# Patient Record
Sex: FEMALE | Race: WHITE | Employment: UNEMPLOYED | URBAN - METROPOLITAN AREA
[De-identification: names, ages, dates, MRNs, and addresses within clinical notes are randomized per-mention and may not be internally consistent; named-entity substitution may affect disease eponyms.]

---

## 2017-03-07 ENCOUNTER — OFFICE VISIT (OUTPATIENT)
Dept: FAMILY MEDICINE CLINIC | Facility: CLINIC | Age: 32
End: 2017-03-07

## 2017-03-07 VITALS
DIASTOLIC BLOOD PRESSURE: 68 MMHG | WEIGHT: 164 LBS | HEART RATE: 86 BPM | SYSTOLIC BLOOD PRESSURE: 112 MMHG | OXYGEN SATURATION: 99 % | RESPIRATION RATE: 14 BRPM | TEMPERATURE: 98 F | BODY MASS INDEX: 26.05 KG/M2 | HEIGHT: 66.5 IN

## 2017-03-07 DIAGNOSIS — Z20.1 EXPOSURE TO TB: Primary | ICD-10-CM

## 2017-03-07 DIAGNOSIS — Z13.228 SCREENING FOR ENDOCRINE, NUTRITIONAL, METABOLIC AND IMMUNITY DISORDER: ICD-10-CM

## 2017-03-07 DIAGNOSIS — Z13.21 SCREENING FOR ENDOCRINE, NUTRITIONAL, METABOLIC AND IMMUNITY DISORDER: ICD-10-CM

## 2017-03-07 DIAGNOSIS — Z13.29 SCREENING FOR ENDOCRINE, NUTRITIONAL, METABOLIC AND IMMUNITY DISORDER: ICD-10-CM

## 2017-03-07 DIAGNOSIS — R93.89 ABNORMAL CT SCAN: ICD-10-CM

## 2017-03-07 DIAGNOSIS — Z13.0 SCREENING FOR ENDOCRINE, NUTRITIONAL, METABOLIC AND IMMUNITY DISORDER: ICD-10-CM

## 2017-03-07 PROCEDURE — 99203 OFFICE O/P NEW LOW 30 MIN: CPT | Performed by: EMERGENCY MEDICINE

## 2017-03-07 NOTE — PATIENT INSTRUCTIONS
Follow up in 1 month for annual physical  Have blood tests done after fasting          TB Screening (Skin)   Does this test have other names?   Tuberculin test, TST, Mantoux, PPD (purified protein derivative)  What is this test?  This test finds out if you be recommended by your healthcare provider, based on your case. What do my test results mean? Many things may affect your lab test results.  These include the method each lab uses to do the test. Even if your test results are different from the normal paco such as medicine for AIDS or cancer  How do I get ready for this test?  You don't need to prepare for this test.  Be sure your healthcare provider knows about all medicines, herbs, vitamins, and supplements you are taking.  This includes medicines that don'

## 2017-03-07 NOTE — PROGRESS NOTES
Chief Complaint:   Patient presents with:  Establish Care: Pt here to establish care. Pt c/o productive cough, started 1 week ago. Pt would also like to discuss previous diagnosis of nodule in RT lower lobe.      HPI:   This is a 33year old female     Rajan 327 hydrated, no distress  SKIN: good skin turgor, no obvious rashes  HEENT: atraumatic, normocephalic, ears, nose and throat are clear  EYES: sclera non icteric bilateral  NECK: supple, no adenopathy, no thyromegaly  LUNGS: clear to auscultation, no RRW  CARD

## 2017-03-14 PROBLEM — R93.89 ABNORMAL CT SCAN: Status: ACTIVE | Noted: 2017-03-14

## 2017-03-14 PROBLEM — Z20.1 EXPOSURE TO TB: Status: ACTIVE | Noted: 2017-03-14

## 2017-03-20 ENCOUNTER — LAB ENCOUNTER (OUTPATIENT)
Dept: LAB | Age: 32
End: 2017-03-20
Attending: EMERGENCY MEDICINE
Payer: COMMERCIAL

## 2017-03-20 ENCOUNTER — NURSE ONLY (OUTPATIENT)
Dept: FAMILY MEDICINE CLINIC | Facility: CLINIC | Age: 32
End: 2017-03-20

## 2017-03-20 DIAGNOSIS — Z13.0 SCREENING FOR ENDOCRINE, NUTRITIONAL, METABOLIC AND IMMUNITY DISORDER: ICD-10-CM

## 2017-03-20 DIAGNOSIS — Z13.21 SCREENING FOR ENDOCRINE, NUTRITIONAL, METABOLIC AND IMMUNITY DISORDER: ICD-10-CM

## 2017-03-20 DIAGNOSIS — Z13.228 SCREENING FOR ENDOCRINE, NUTRITIONAL, METABOLIC AND IMMUNITY DISORDER: ICD-10-CM

## 2017-03-20 DIAGNOSIS — Z13.29 SCREENING FOR ENDOCRINE, NUTRITIONAL, METABOLIC AND IMMUNITY DISORDER: ICD-10-CM

## 2017-03-20 DIAGNOSIS — Z20.1 EXPOSURE TO TB: Primary | ICD-10-CM

## 2017-03-20 LAB
ALBUMIN SERPL-MCNC: 4 G/DL (ref 3.5–4.8)
ALP LIVER SERPL-CCNC: 55 U/L (ref 37–98)
ALT SERPL-CCNC: 15 U/L (ref 14–54)
AST SERPL-CCNC: 14 U/L (ref 15–41)
BASOPHILS # BLD AUTO: 0.05 X10(3) UL (ref 0–0.1)
BASOPHILS NFR BLD AUTO: 0.9 %
BILIRUB SERPL-MCNC: 0.5 MG/DL (ref 0.1–2)
BUN BLD-MCNC: 12 MG/DL (ref 8–20)
CALCIUM BLD-MCNC: 8.7 MG/DL (ref 8.3–10.3)
CHLORIDE: 107 MMOL/L (ref 101–111)
CHOLEST SMN-MCNC: 147 MG/DL (ref ?–200)
CO2: 23 MMOL/L (ref 22–32)
CREAT BLD-MCNC: 0.82 MG/DL (ref 0.55–1.02)
EOSINOPHIL # BLD AUTO: 0.13 X10(3) UL (ref 0–0.3)
EOSINOPHIL NFR BLD AUTO: 2.4 %
ERYTHROCYTE [DISTWIDTH] IN BLOOD BY AUTOMATED COUNT: 12.3 % (ref 11.5–16)
FREE T4: 1 NG/DL (ref 0.9–1.8)
GLUCOSE BLD-MCNC: 86 MG/DL (ref 70–99)
HCT VFR BLD AUTO: 39.8 % (ref 34–50)
HDLC SERPL-MCNC: 50 MG/DL (ref 45–?)
HDLC SERPL: 2.94 {RATIO} (ref ?–4.44)
HGB BLD-MCNC: 12.9 G/DL (ref 12–16)
IMMATURE GRANULOCYTE COUNT: 0.01 X10(3) UL (ref 0–1)
IMMATURE GRANULOCYTE RATIO %: 0.2 %
LDLC SERPL CALC-MCNC: 78 MG/DL (ref ?–130)
LYMPHOCYTES # BLD AUTO: 1.8 X10(3) UL (ref 0.9–4)
LYMPHOCYTES NFR BLD AUTO: 33.5 %
M PROTEIN MFR SERPL ELPH: 7 G/DL (ref 6.1–8.3)
MCH RBC QN AUTO: 30.2 PG (ref 27–33.2)
MCHC RBC AUTO-ENTMCNC: 32.4 G/DL (ref 31–37)
MCV RBC AUTO: 93.2 FL (ref 81–100)
MONOCYTES # BLD AUTO: 0.38 X10(3) UL (ref 0.1–0.6)
MONOCYTES NFR BLD AUTO: 7.1 %
NEUTROPHIL ABS PRELIM: 3 X10 (3) UL (ref 1.3–6.7)
NEUTROPHILS # BLD AUTO: 3 X10(3) UL (ref 1.3–6.7)
NEUTROPHILS NFR BLD AUTO: 55.9 %
NONHDLC SERPL-MCNC: 97 MG/DL (ref ?–130)
PLATELET # BLD AUTO: 215 10(3)UL (ref 150–450)
POTASSIUM SERPL-SCNC: 4 MMOL/L (ref 3.6–5.1)
RBC # BLD AUTO: 4.27 X10(6)UL (ref 3.8–5.1)
RED CELL DISTRIBUTION WIDTH-SD: 42.5 FL (ref 35.1–46.3)
SODIUM SERPL-SCNC: 140 MMOL/L (ref 136–144)
TRIGLYCERIDES: 94 MG/DL (ref ?–150)
TSI SER-ACNC: 5.51 MIU/ML (ref 0.35–5.5)
VLDL: 19 MG/DL (ref 5–40)
WBC # BLD AUTO: 5.4 X10(3) UL (ref 4–13)

## 2017-03-20 PROCEDURE — 80053 COMPREHEN METABOLIC PANEL: CPT

## 2017-03-20 PROCEDURE — 84443 ASSAY THYROID STIM HORMONE: CPT

## 2017-03-20 PROCEDURE — 84439 ASSAY OF FREE THYROXINE: CPT

## 2017-03-20 PROCEDURE — 80061 LIPID PANEL: CPT

## 2017-03-20 PROCEDURE — 86580 TB INTRADERMAL TEST: CPT | Performed by: FAMILY MEDICINE

## 2017-03-20 PROCEDURE — 85025 COMPLETE CBC W/AUTO DIFF WBC: CPT

## 2017-03-20 PROCEDURE — 36415 COLL VENOUS BLD VENIPUNCTURE: CPT

## 2017-03-22 ENCOUNTER — NURSE ONLY (OUTPATIENT)
Dept: FAMILY MEDICINE CLINIC | Facility: CLINIC | Age: 32
End: 2017-03-22

## 2017-03-22 LAB — INDURATION (): 0 MM (ref 0–11)

## 2017-04-04 ENCOUNTER — OFFICE VISIT (OUTPATIENT)
Dept: FAMILY MEDICINE CLINIC | Facility: CLINIC | Age: 32
End: 2017-04-04

## 2017-04-04 ENCOUNTER — HOSPITAL ENCOUNTER (OUTPATIENT)
Dept: ULTRASOUND IMAGING | Age: 32
Discharge: HOME | End: 2017-04-04
Attending: EMERGENCY MEDICINE
Payer: COMMERCIAL

## 2017-04-04 ENCOUNTER — LAB ENCOUNTER (OUTPATIENT)
Dept: LAB | Age: 32
End: 2017-04-04
Attending: EMERGENCY MEDICINE
Payer: COMMERCIAL

## 2017-04-04 VITALS
OXYGEN SATURATION: 99 % | WEIGHT: 166 LBS | HEART RATE: 70 BPM | RESPIRATION RATE: 16 BRPM | DIASTOLIC BLOOD PRESSURE: 68 MMHG | SYSTOLIC BLOOD PRESSURE: 104 MMHG | TEMPERATURE: 98 F | BODY MASS INDEX: 26 KG/M2

## 2017-04-04 DIAGNOSIS — N92.6 IRREGULAR MENSES: ICD-10-CM

## 2017-04-04 DIAGNOSIS — R10.2 PELVIC PAIN: ICD-10-CM

## 2017-04-04 DIAGNOSIS — R10.2 PELVIC PAIN: Primary | ICD-10-CM

## 2017-04-04 PROCEDURE — 36415 COLL VENOUS BLD VENIPUNCTURE: CPT

## 2017-04-04 PROCEDURE — 87591 N.GONORRHOEAE DNA AMP PROB: CPT

## 2017-04-04 PROCEDURE — 99214 OFFICE O/P EST MOD 30 MIN: CPT | Performed by: EMERGENCY MEDICINE

## 2017-04-04 PROCEDURE — 87491 CHLMYD TRACH DNA AMP PROBE: CPT

## 2017-04-04 PROCEDURE — 84703 CHORIONIC GONADOTROPIN ASSAY: CPT

## 2017-04-04 PROCEDURE — 76830 TRANSVAGINAL US NON-OB: CPT

## 2017-04-04 PROCEDURE — 76856 US EXAM PELVIC COMPLETE: CPT

## 2017-04-04 PROCEDURE — 85025 COMPLETE CBC W/AUTO DIFF WBC: CPT

## 2017-04-04 NOTE — PATIENT INSTRUCTIONS
Thank you for choosing [facility]  To Do:  FOR MARTHA PEARSON  1. Arrange for ultrasound  2. Have blood tests done today  3. Follow up for annual exam  4. May try OTC Tylenol for pain and cramping  5.  TO Er if with profuse uncontrolled bleedin provider  · Pain worsens or you have sudden, severe pain or new pain  · Nausea, vomiting, sweating, or restlessness  · Dizziness or fainting  · Unusual vaginal discharge  · Abnormal vaginal bleeding (especially bleeding after menopause)  Date Last Reviewed

## 2017-04-04 NOTE — PROGRESS NOTES
Chief Complaint:   Patient presents with:  Irregular Periods    HPI:   This is a 33year old female C/O abdominal cramping and irregular vaginal bleeding. Yesterday passed an odd looking clot. Did home pregnancy test a few days ago which was neg.  No missed age, well groomed.   GENERAL: well developed, well nourished, well hydrated, no distress  SKIN: good skin turgor, no obvious rashes  HEENT: atraumatic, normocephalic, ears, nose and throat are clear  EYES: sclera non icteric bilateral  NECK: supple, no ivan

## 2017-04-05 ENCOUNTER — TELEPHONE (OUTPATIENT)
Dept: FAMILY MEDICINE CLINIC | Facility: CLINIC | Age: 32
End: 2017-04-05

## 2017-04-05 DIAGNOSIS — R79.89 ELEVATED TSH: Primary | ICD-10-CM

## 2017-04-05 NOTE — TELEPHONE ENCOUNTER
Pt notified on negative HCG/CBC. ralph Pedraza still in process. She states understanding. Pt seen in office on 4/4, TSH was elevated per 3/21 lab results & pt was to f/u in OV, did you talk to pt about elevated TSH?   If not, any orders for pt?

## 2017-04-05 NOTE — TELEPHONE ENCOUNTER
----- Message from Joel Rebollar MD sent at 4/4/2017  9:08 PM CDT -----  No acute findings on US  Pls ask pt to follow up in 1 week if not better

## 2017-04-05 NOTE — TELEPHONE ENCOUNTER
----- Message from Tung Tamayo MD sent at 3/21/2017  1:28 PM CDT -----  + Elevated TSH  Pt needs follow up OV for discussion of ABN TSH

## 2017-04-06 NOTE — TELEPHONE ENCOUNTER
Called patient  Discussed elevated TSH  Pt to repeat blood tests in 2 weeks  Follow up in 2 weeks for discussion of results  Labs pended

## 2017-04-24 ENCOUNTER — APPOINTMENT (OUTPATIENT)
Dept: LAB | Age: 32
End: 2017-04-24
Attending: EMERGENCY MEDICINE
Payer: COMMERCIAL

## 2017-04-24 DIAGNOSIS — R79.89 ELEVATED TSH: ICD-10-CM

## 2017-04-24 PROCEDURE — 86800 THYROGLOBULIN ANTIBODY: CPT

## 2017-04-24 PROCEDURE — 84443 ASSAY THYROID STIM HORMONE: CPT

## 2017-04-24 PROCEDURE — 86376 MICROSOMAL ANTIBODY EACH: CPT

## 2017-04-24 PROCEDURE — 84439 ASSAY OF FREE THYROXINE: CPT

## 2017-04-24 PROCEDURE — 36415 COLL VENOUS BLD VENIPUNCTURE: CPT

## 2017-04-28 ENCOUNTER — OFFICE VISIT (OUTPATIENT)
Dept: FAMILY MEDICINE CLINIC | Facility: CLINIC | Age: 32
End: 2017-04-28

## 2017-04-28 VITALS
HEIGHT: 66.5 IN | SYSTOLIC BLOOD PRESSURE: 104 MMHG | DIASTOLIC BLOOD PRESSURE: 66 MMHG | BODY MASS INDEX: 26.84 KG/M2 | RESPIRATION RATE: 14 BRPM | WEIGHT: 169 LBS | OXYGEN SATURATION: 99 % | TEMPERATURE: 98 F | HEART RATE: 92 BPM

## 2017-04-28 DIAGNOSIS — E03.9 HYPOTHYROIDISM, UNSPECIFIED TYPE: Primary | ICD-10-CM

## 2017-04-28 PROCEDURE — 99214 OFFICE O/P EST MOD 30 MIN: CPT | Performed by: EMERGENCY MEDICINE

## 2017-04-28 RX ORDER — LEVOTHYROXINE SODIUM 0.05 MG/1
50 TABLET ORAL
Qty: 90 TABLET | Refills: 0 | Status: SHIPPED | OUTPATIENT
Start: 2017-04-28 | End: 2017-08-24

## 2017-04-28 NOTE — PATIENT INSTRUCTIONS
Thank you for choosing [facility]  To Do:  FOR MARTHA PEARSON  1. Follow up in 6-8 weeks  2. Have blood test done before office visit          Hypothyroidism       You have hypothyroidism.  This means your thyroid gland is not making enough thy hormone pills. · Don’t take other medicines with your thyroid hormone pill without checking with your provider first.  · Tell your provider if you have any side effects from your medicines that bother you.   · Never change the dosage or stop taking your th pituitary gland can lead to not enough production of thyroid hormone. Hypothyroidism will also occur if the thyroid gland is removed during surgery.   Common symptoms include:  · Low energy and tiredness  · Depression  · Feeling cold  · Muscle pain  · Slowe follow your healthcare professional's instructions. Thyroid Stimulating Hormone  Does this test have other names? TSH,  thyrotropin test  What is this test?  This is a blood test that measures your level of thyroid stimulating hormone (TSH).  Health thyroid hormones  · TSH receptor-stimulator antibodies, which is used to diagnose Graves disease  · Thyroid antiperoxidase antibodies and thyroglobulin antibodies. These are used to diagnose a condition called Hashimoto's thyroiditis.   What do my test resu Always follow your healthcare professional's instructions. Treating Thyroid Problems    Your healthcare provider has diagnosed a thyroid problem and will work with you to create a treatment plan.  Even if you don’t have symptoms, getting proper care iodine ablation. This is the most common treatment for hyperthyroidism. It’s done by taking a pill or liquid dose of radioactive iodine. This treatment destroys the thyroid cells that are making too much hormone.  You may need daily thyroid hormone pills af possible complications. You will likely receive the iodine at the hospital and go home the same day. The risk from the radiation to yourself and others is very small. However, you may need to stay away from other people for several days.  It is most importa

## 2017-04-28 NOTE — PROGRESS NOTES
Chief Complaint:   Patient presents with: Follow - Up: F/u thryoid    HPI:   This is a 33year old female     FF UP ABNORMAL LABS  Patient is a 33-year-old female presents today for follow-up regarding recent laboratories have been done.   Recent laborator (36.8 °C) (Oral)  Resp 14  Ht 66.5\"  Wt 169 lb  BMI 26.87 kg/m2  SpO2 99%  LMP 04/05/2017 (Approximate) Estimated body mass index is 26.87 kg/(m^2) as calculated from the following:    Height as of this encounter: 66.5\".     Weight as of this encounter: 1 4. 0-13.0 x10(3) uL   RBC 4.27 3.80-5.10 x10(6)uL   HGB 12.9 12.0-16.0 g/dL   HCT 39.8 34.0-50.0 %   .0 150.0-450.0 10(3)uL   MCV 93.2 81.0-100.0 fL   MCH 30.2 27.0-33.2 pg   MCHC 32.4 31.0-37.0 g/dL   RDW 12.3 11.5-16.0 %   RDW-SD 42.5 35.1-46.3 fL Absolute 0.48 0.10-0.60 x10(3) uL   Eosinophil Absolute 0.11 0.00-0.30 x10(3) uL   Basophil Absolute 0.04 0.00-0.10 x10(3) uL   Immature Granulocyte Absolute 0.01 0.00-1.00 x10(3) uL   Neutrophil % 55.8 %   Lymphocyte % 33.2 %   Monocyte % 8.2 %   Eosinoph

## 2017-06-09 ENCOUNTER — APPOINTMENT (OUTPATIENT)
Dept: LAB | Age: 32
End: 2017-06-09
Attending: EMERGENCY MEDICINE
Payer: COMMERCIAL

## 2017-06-09 DIAGNOSIS — E03.9 HYPOTHYROIDISM, UNSPECIFIED TYPE: ICD-10-CM

## 2017-06-09 PROCEDURE — 84443 ASSAY THYROID STIM HORMONE: CPT

## 2017-06-09 PROCEDURE — 36415 COLL VENOUS BLD VENIPUNCTURE: CPT

## 2017-06-09 PROCEDURE — 84439 ASSAY OF FREE THYROXINE: CPT

## 2017-06-12 ENCOUNTER — OFFICE VISIT (OUTPATIENT)
Dept: FAMILY MEDICINE CLINIC | Facility: CLINIC | Age: 32
End: 2017-06-12

## 2017-06-12 VITALS
BODY MASS INDEX: 26.2 KG/M2 | DIASTOLIC BLOOD PRESSURE: 64 MMHG | HEIGHT: 66.5 IN | SYSTOLIC BLOOD PRESSURE: 108 MMHG | TEMPERATURE: 98 F | WEIGHT: 165 LBS | HEART RATE: 62 BPM | RESPIRATION RATE: 14 BRPM | OXYGEN SATURATION: 99 %

## 2017-06-12 DIAGNOSIS — E03.9 HYPOTHYROIDISM, UNSPECIFIED TYPE: Primary | ICD-10-CM

## 2017-06-12 DIAGNOSIS — R53.83 FATIGUE, UNSPECIFIED TYPE: ICD-10-CM

## 2017-06-12 PROCEDURE — 99213 OFFICE O/P EST LOW 20 MIN: CPT | Performed by: EMERGENCY MEDICINE

## 2017-06-12 RX ORDER — DOXEPIN HYDROCHLORIDE 50 MG/1
1 CAPSULE ORAL DAILY
COMMUNITY
End: 2018-03-14 | Stop reason: ALTCHOICE

## 2017-06-12 NOTE — PROGRESS NOTES
Chief Complaint:   Patient presents with:  Lab Results: review thyroid labs    HPI:   This is a 27year old female   Here for follow up thyroid problems  Recently started on Levothyroxine  Feels about the same. No new sx  Still complains of fatigue.    No l Review of systems significant for fatigue    The rest of the ROS is negative except for those stated as above    PHYSICAL EXAM:   /64 mmHg  Pulse 62  Temp(Src) 97.9 °F (36.6 °C) (Oral)  Resp 14  Ht 66.5\"  Wt 165 lb  BMI 26.24 kg/m2  SpO2 99%  LMP very much. Review of her TSH in the past shows that she has had high TSH > 5 in the past. Today TSH is lower at 1.5. T4 remains stable. Will continue with levothyroxine replacement for now. We will continue to monitor.   Time body significant for positi

## 2017-06-12 NOTE — PATIENT INSTRUCTIONS
Thank you for choosing [facility]  To Do:  FOR MARTHA PEARSON  1. Continue with Levothyroxine  2. Follow up in 8 weeks  3. Repeat blood tests in 8 weeks              Hypothyroidism       You have hypothyroidism.  This means your thyroid gland i of taking your thyroid hormone pills. · Don’t take other medicines with your thyroid hormone pill without checking with your provider first.  · Tell your provider if you have any side effects from your medicines that bother you.   · Never change the dosage your thyroid doesn’t make. You will likely need to take a daily pill for the rest of your life. Your healthcare provider will adjust your dose to achieve the right hormone levels. Take the thyroid hormone pill on an empty stomach, without other medicines. by beta-blockers alone. Treating nodules  If you have benign nodules, you may not need treatment right away. Instead, your healthcare provider may advise regular exams and ultrasound tests to see if the nodules grow.  If treatment is needed, it may include

## 2017-07-12 NOTE — ADDENDUM NOTE
Encounter addended by: Bijan Hall, [address] on: 7/12/2017  8:54 AM<BR>    Actions taken: Letter status changed

## 2017-07-21 ENCOUNTER — APPOINTMENT (OUTPATIENT)
Dept: LAB | Age: 32
End: 2017-07-21
Attending: EMERGENCY MEDICINE
Payer: COMMERCIAL

## 2017-07-21 DIAGNOSIS — E03.9 HYPOTHYROIDISM, UNSPECIFIED TYPE: ICD-10-CM

## 2017-07-21 LAB
FREE T4: 1 NG/DL (ref 0.9–1.8)
TSI SER-ACNC: 2.82 MIU/ML (ref 0.35–5.5)

## 2017-07-21 PROCEDURE — 84439 ASSAY OF FREE THYROXINE: CPT

## 2017-07-21 PROCEDURE — 84443 ASSAY THYROID STIM HORMONE: CPT

## 2017-07-21 PROCEDURE — 36415 COLL VENOUS BLD VENIPUNCTURE: CPT

## 2017-07-25 ENCOUNTER — OFFICE VISIT (OUTPATIENT)
Dept: FAMILY MEDICINE CLINIC | Facility: CLINIC | Age: 32
End: 2017-07-25

## 2017-07-25 VITALS
RESPIRATION RATE: 14 BRPM | HEART RATE: 76 BPM | WEIGHT: 175 LBS | BODY MASS INDEX: 27.79 KG/M2 | TEMPERATURE: 98 F | OXYGEN SATURATION: 99 % | HEIGHT: 66.5 IN | DIASTOLIC BLOOD PRESSURE: 70 MMHG | SYSTOLIC BLOOD PRESSURE: 106 MMHG

## 2017-07-25 DIAGNOSIS — E06.3 HYPOTHYROIDISM, ACQUIRED, AUTOIMMUNE: Primary | ICD-10-CM

## 2017-07-25 DIAGNOSIS — E66.3 OVERWEIGHT (BMI 25.0-29.9): ICD-10-CM

## 2017-07-25 PROCEDURE — 99213 OFFICE O/P EST LOW 20 MIN: CPT | Performed by: EMERGENCY MEDICINE

## 2017-07-25 RX ORDER — LEVOTHYROXINE SODIUM 0.07 MG/1
75 TABLET ORAL
Qty: 30 TABLET | Refills: 2 | Status: SHIPPED | OUTPATIENT
Start: 2017-07-25 | End: 2017-08-24

## 2017-07-25 NOTE — PROGRESS NOTES
Chief Complaint:   Patient presents with: Follow - Up: F/U LAB RESULTS    HPI:   This is a 27year old female       FF UP HYPOTHYROIDISM  Reports increased energy. Mood is better.  Still with fatigue but with noticeable improvement  Feels difference while systems significant for weight gain    The rest of the ROS is negative except for those stated as above    PHYSICAL EXAM:   /70 (BP Location: Right arm, Patient Position: Sitting, Cuff Size: adult)   Pulse 76   Temp 97.9 °F (36.6 °C) (Oral)   Resp 14

## 2017-07-25 NOTE — PATIENT INSTRUCTIONS
Thank you for choosing [address] [place] [address] Group  To Do:  FOR MARTHA PEARSON  1. Ff up in 8 weeks  2. Blood test b efore visit        Hypothyroidism       You have hypothyroidism. This means your thyroid gland is not making enough thyroid hormone.  This hormo take other medicines with your thyroid hormone pill without checking with your provider first.  · Tell your provider if you have any side effects from your medicines that bother you.   · Never change the dosage or stop taking your thyroid pills without talk week), or a health goal (such as lowering your blood pressure). Choose a goal that is measurable and reasonable, so you know when you've reached it.  A goal of reaching a BMI of less than 25 is not always reasonable (or possible).   Make an action plan  Hab healthcare provider. Fiction: “The faster I lose weight, the better.”  Fact: Rapid weight loss is usually due to loss of water or muscle mass. What you’re trying to get rid of is extra fat. Aim to lose a 1/2 pound to 2 pounds a week.  Then you’re more li 2/4/2016  © 3068-9057 The [address] [place] [address], [address] [place] [address]. All rights reserved. This information is not intended as a substitute for professional medical care. Always follow your healthcare professional's instructions. start an exercise program. Have a  help you develop a plan that’s safe for you. Date Last Reviewed: 2/4/2016  © 1910-9679 The [address] [place] [address], [address] [place] [address]. All rights reserved.  This information is n else instead of eating. · Eat slower, shooting for 20 to 30 minutes for each meal. It takes 20 minutes for your stomach to tell your brain that it’s full.  Slow eaters tend to eat less and are still satisfied, while fast eaters may tend to be overeaters.

## 2017-08-22 ENCOUNTER — HOSPITAL ENCOUNTER (EMERGENCY)
Age: 32
Discharge: HOME | End: 2017-08-22
Attending: EMERGENCY MEDICINE
Payer: COMMERCIAL

## 2017-08-22 ENCOUNTER — APPOINTMENT (OUTPATIENT)
Dept: GENERAL RADIOLOGY | Age: 32
End: 2017-08-22
Payer: COMMERCIAL

## 2017-08-22 VITALS
DIASTOLIC BLOOD PRESSURE: 72 MMHG | HEART RATE: 78 BPM | HEIGHT: 66 IN | TEMPERATURE: 98 F | BODY MASS INDEX: 27.97 KG/M2 | SYSTOLIC BLOOD PRESSURE: 137 MMHG | OXYGEN SATURATION: 100 % | RESPIRATION RATE: 20 BRPM | WEIGHT: 174 LBS

## 2017-08-22 DIAGNOSIS — R07.9 ACUTE CHEST PAIN: Primary | ICD-10-CM

## 2017-08-22 LAB
ALBUMIN SERPL-MCNC: 3.9 G/DL (ref 3.5–4.8)
ALP LIVER SERPL-CCNC: 50 U/L (ref 37–98)
ALT SERPL-CCNC: 13 U/L (ref 14–54)
AST SERPL-CCNC: 12 U/L (ref 15–41)
BASOPHILS # BLD AUTO: 0.07 X10(3) UL (ref 0–0.1)
BASOPHILS NFR BLD AUTO: 1.1 %
BILIRUB SERPL-MCNC: 0.4 MG/DL (ref 0.1–2)
BUN BLD-MCNC: 14 MG/DL (ref 8–20)
CALCIUM BLD-MCNC: 8.2 MG/DL (ref 8.3–10.3)
CHLORIDE: 106 MMOL/L (ref 101–111)
CO2: 28 MMOL/L (ref 22–32)
CREAT BLD-MCNC: 0.8 MG/DL (ref 0.55–1.02)
D-DIMER: <0.27 UG/ML FEU (ref 0–0.49)
EOSINOPHIL # BLD AUTO: 0.23 X10(3) UL (ref 0–0.3)
EOSINOPHIL NFR BLD AUTO: 3.5 %
ERYTHROCYTE [DISTWIDTH] IN BLOOD BY AUTOMATED COUNT: 11.9 % (ref 11.5–16)
GLUCOSE BLD-MCNC: 105 MG/DL (ref 70–99)
HCT VFR BLD AUTO: 36.4 % (ref 34–50)
HGB BLD-MCNC: 11.9 G/DL (ref 12–16)
IMMATURE GRANULOCYTE COUNT: 0.01 X10(3) UL (ref 0–1)
IMMATURE GRANULOCYTE RATIO %: 0.2 %
LYMPHOCYTES # BLD AUTO: 2.64 X10(3) UL (ref 0.9–4)
LYMPHOCYTES NFR BLD AUTO: 40.3 %
M PROTEIN MFR SERPL ELPH: 7.3 G/DL (ref 6.1–8.3)
MCH RBC QN AUTO: 30.4 PG (ref 27–33.2)
MCHC RBC AUTO-ENTMCNC: 32.7 G/DL (ref 31–37)
MCV RBC AUTO: 93.1 FL (ref 81–100)
MONOCYTES # BLD AUTO: 0.46 X10(3) UL (ref 0.1–0.6)
MONOCYTES NFR BLD AUTO: 7 %
NEUTROPHIL ABS PRELIM: 3.14 X10 (3) UL (ref 1.3–6.7)
NEUTROPHILS # BLD AUTO: 3.14 X10(3) UL (ref 1.3–6.7)
NEUTROPHILS NFR BLD AUTO: 47.9 %
PLATELET # BLD AUTO: 214 10(3)UL (ref 150–450)
POCT LOT NUMBER: NORMAL
POCT URINE PREGNANCY: NEGATIVE
POTASSIUM SERPL-SCNC: 3.8 MMOL/L (ref 3.6–5.1)
RBC # BLD AUTO: 3.91 X10(6)UL (ref 3.8–5.1)
RED CELL DISTRIBUTION WIDTH-SD: 41 FL (ref 35.1–46.3)
SODIUM SERPL-SCNC: 139 MMOL/L (ref 136–144)
TROPONIN: <0.046 NG/ML (ref ?–0.05)
WBC # BLD AUTO: 6.6 X10(3) UL (ref 4–13)

## 2017-08-22 PROCEDURE — 81025 URINE PREGNANCY TEST: CPT

## 2017-08-22 PROCEDURE — 93005 ELECTROCARDIOGRAM TRACING: CPT

## 2017-08-22 PROCEDURE — 93010 ELECTROCARDIOGRAM REPORT: CPT

## 2017-08-22 PROCEDURE — 84484 ASSAY OF TROPONIN QUANT: CPT | Performed by: EMERGENCY MEDICINE

## 2017-08-22 PROCEDURE — 85025 COMPLETE CBC W/AUTO DIFF WBC: CPT | Performed by: EMERGENCY MEDICINE

## 2017-08-22 PROCEDURE — 80053 COMPREHEN METABOLIC PANEL: CPT | Performed by: EMERGENCY MEDICINE

## 2017-08-22 PROCEDURE — 71020 XR CHEST PA + LAT CHEST (CPT=71020): CPT

## 2017-08-22 PROCEDURE — 85025 COMPLETE CBC W/AUTO DIFF WBC: CPT

## 2017-08-22 PROCEDURE — 99285 EMERGENCY DEPT VISIT HI MDM: CPT

## 2017-08-22 PROCEDURE — 36415 COLL VENOUS BLD VENIPUNCTURE: CPT

## 2017-08-22 PROCEDURE — 80053 COMPREHEN METABOLIC PANEL: CPT

## 2017-08-22 PROCEDURE — 84484 ASSAY OF TROPONIN QUANT: CPT

## 2017-08-22 PROCEDURE — 71020 XR CHEST PA + LAT CHEST (CPT=71020): CPT | Performed by: EMERGENCY MEDICINE

## 2017-08-22 PROCEDURE — 85378 FIBRIN DEGRADE SEMIQUANT: CPT | Performed by: EMERGENCY MEDICINE

## 2017-08-22 RX ORDER — ACETAMINOPHEN 500 MG
1000 TABLET ORAL ONCE
Status: COMPLETED | OUTPATIENT
Start: 2017-08-22 | End: 2017-08-22

## 2017-08-23 LAB
ATRIAL RATE: 63 BPM
P AXIS: 69 DEGREES
P-R INTERVAL: 140 MS
Q-T INTERVAL: 428 MS
QRS DURATION: 82 MS
QTC CALCULATION (BEZET): 437 MS
R AXIS: 80 DEGREES
T AXIS: 67 DEGREES
VENTRICULAR RATE: 63 BPM

## 2017-08-23 NOTE — ED INITIAL ASSESSMENT (HPI)
STS LEFT CHEST AND LEFT AXILLA PAIN SINCE 15 MIN PTA. DENIES SOB WITH ONSET. DENIES N/V. STS HISTORY OF HAVING TO WEAR HOLTER MONITORS IN THE PAST.

## 2017-08-23 NOTE — ED PROVIDER NOTES
Patient Seen in: [facility] [place]    History   Patient presents with:  Chest Pain Angina (cardiovascular)  Dizziness (neurologic)  Numbness Weakness (neurologic)    Stated Complaint: Tightness in chest, dizziness, numbness to lef Family History   Problem Relation Age of Onset   • Other [OTHER] Other      osteoporosis mat side   • Other [OTHER] Father      Diverticulosis   • Cancer Mother      blastoma   • Osteoporosis [OTHER] Maternal Aunt    • Osteoporosis [OTHER] Maternal [place] AST 12 (*)     Alt 13 (*)     All other components within normal limits   CBC W/ DIFFERENTIAL - Abnormal; Notable for the following:     HGB 11.9 (*)     All other components within normal limits   TROPONIN I - Normal   D-DIMER - Normal    Narrative: saline. Patient was given Tylenol. She cannot take NSAIDs. Basic labs were obtained. CBC, CMP were unremarkable. D-dimer and troponin were negative. Pregnancy test was negative. EKG showed a sinus rhythm no acute changes.   There is no clear etiology

## 2017-08-24 ENCOUNTER — OFFICE VISIT (OUTPATIENT)
Dept: FAMILY MEDICINE CLINIC | Facility: CLINIC | Age: 32
End: 2017-08-24

## 2017-08-24 VITALS
WEIGHT: 170 LBS | TEMPERATURE: 99 F | BODY MASS INDEX: 27 KG/M2 | RESPIRATION RATE: 18 BRPM | HEART RATE: 68 BPM | OXYGEN SATURATION: 99 % | DIASTOLIC BLOOD PRESSURE: 68 MMHG | HEIGHT: 66.5 IN | SYSTOLIC BLOOD PRESSURE: 112 MMHG

## 2017-08-24 DIAGNOSIS — F41.9 ANXIETY: ICD-10-CM

## 2017-08-24 DIAGNOSIS — E03.9 HYPOTHYROIDISM, UNSPECIFIED TYPE: ICD-10-CM

## 2017-08-24 DIAGNOSIS — R07.89 ATYPICAL CHEST PAIN: Primary | ICD-10-CM

## 2017-08-24 DIAGNOSIS — Z13.39 ADHD (ATTENTION DEFICIT HYPERACTIVITY DISORDER) EVALUATION: ICD-10-CM

## 2017-08-24 PROCEDURE — 99214 OFFICE O/P EST MOD 30 MIN: CPT | Performed by: EMERGENCY MEDICINE

## 2017-08-24 RX ORDER — LEVOTHYROXINE SODIUM 0.07 MG/1
75 TABLET ORAL
Qty: 30 TABLET | Refills: 2 | Status: SHIPPED | OUTPATIENT
Start: 2017-08-24 | End: 2018-03-14

## 2017-08-24 NOTE — PROGRESS NOTES
Chief Complaint:   Patient presents with: Anxiety: [facility] Emergency room f/u 8/22/17 Chest pain ,anxiety    HPI:   This is a 27year old female     CHEST TIGHTNESS  Chest tightness for the past 6 months. On and off.  Sporadic non exertional.  No heavy bobbi in which it is inappropriate (in adolescents or adults, may be limited to subjective feelings of restlessness): YES  Often has difficulty playing or engaging in leisure activities quietly: NO  Is often \"on the go\" or often acts as if \"driven by a motor\ Temp 98.6 °F (37 °C) (Oral)   Resp 18   Ht 66.5\"   Wt 170 lb   LMP 08/15/2017   SpO2 99%   BMI 27.03 kg/m²  Estimated body mass index is 27.03 kg/m² as calculated from the following:    Height as of this encounter: 66.5\".     Weight as of this encounter:

## 2017-08-24 NOTE — PATIENT INSTRUCTIONS
Thank you for choosing [facility]  To Do:  FOR MARTHA PAERSON  1. Arrange for neuro examination  2. Arrange for holter monitoring and stress testing and 2Decho of heart  3. Ff up after all testing  4.  Blood test today for thyroid        Arrang will explain when and how to use it. It may be prescribed for use before situations that makes you anxious. Or, you may be told to take it on a regular schedule.  Anti-anxiety medication may make you feel a little sleepy or “out of it.” Don’t drive a car or start taking medications again. This isn’t your fault. It’s just the nature of your anxiety disorder. Special concerns  · Side effects: Medications may cause side effects. Ask your health care provider or pharmacist what you can expect.  They may have idea involves homework and activities to build skills that teach you to cope with anxiety step by step. It can be done in a group or one-on-one, and often takes place for a set number of sessions.  CBT has two main parts:  · Cognitive therapy helps you identify caffeine and nicotine, which can make anxiety symptoms worse. · Fight the temptation to turn to alcohol or unprescribed drugs for relief. They only make things worse in the long run. Date Last Reviewed: 1/19/2015  © 9727-1722 The [address].

## 2017-09-01 ENCOUNTER — HOSPITAL ENCOUNTER (OUTPATIENT)
Dept: CV DIAGNOSTICS | Age: 32
Discharge: HOME | End: 2017-09-01
Attending: EMERGENCY MEDICINE
Payer: COMMERCIAL

## 2017-09-01 DIAGNOSIS — R07.89 ATYPICAL CHEST PAIN: ICD-10-CM

## 2017-09-01 PROCEDURE — 93225 XTRNL ECG REC<48 HRS REC: CPT | Performed by: EMERGENCY MEDICINE

## 2017-09-01 PROCEDURE — 93226 XTRNL ECG REC<48 HR SCAN A/R: CPT | Performed by: EMERGENCY MEDICINE

## 2017-09-01 PROCEDURE — 93227 XTRNL ECG REC<48 HR R&I: CPT | Performed by: EMERGENCY MEDICINE

## 2017-09-12 ENCOUNTER — TELEPHONE (OUTPATIENT)
Dept: FAMILY MEDICINE CLINIC | Facility: CLINIC | Age: 32
End: 2017-09-12

## 2017-09-12 NOTE — TELEPHONE ENCOUNTER
----- Message from Yessenia Whitney MD sent at 9/7/2017 12:01 PM CDT -----  Unremarkable Holter monitor

## 2017-09-12 NOTE — TELEPHONE ENCOUNTER
Discussed holter result with patient who states she is still getting period sharp pains on the left side of her chest.  She has her EKG and stress test scheduled for 9-19.   Informed her that after those tests are reviewed, Dr. Mj Hooper would determine if f

## 2017-09-19 ENCOUNTER — HOSPITAL ENCOUNTER (OUTPATIENT)
Dept: CV DIAGNOSTICS | Age: 32
Discharge: HOME | End: 2017-09-19
Attending: EMERGENCY MEDICINE
Payer: COMMERCIAL

## 2017-09-19 DIAGNOSIS — R07.89 ATYPICAL CHEST PAIN: ICD-10-CM

## 2017-09-19 PROCEDURE — 93306 TTE W/DOPPLER COMPLETE: CPT | Performed by: EMERGENCY MEDICINE

## 2017-09-19 PROCEDURE — 93017 CV STRESS TEST TRACING ONLY: CPT | Performed by: EMERGENCY MEDICINE

## 2017-09-19 PROCEDURE — 93018 CV STRESS TEST I&R ONLY: CPT | Performed by: EMERGENCY MEDICINE

## 2017-09-22 ENCOUNTER — TELEPHONE (OUTPATIENT)
Dept: FAMILY MEDICINE CLINIC | Facility: CLINIC | Age: 32
End: 2017-09-22

## 2017-09-27 ENCOUNTER — TELEPHONE (OUTPATIENT)
Dept: FAMILY MEDICINE CLINIC | Facility: CLINIC | Age: 32
End: 2017-09-27

## 2017-09-27 NOTE — TELEPHONE ENCOUNTER
Patient requesting refill on Levothyroxine Sodium 75 MCG Oral Tab. Please advise. She has apt on 10/2.

## 2017-09-27 NOTE — TELEPHONE ENCOUNTER
Rx sent 8/24/17 for #30 with 2 additional refills. Called [facility] and spoke with Ivan Mora. She states that patient does have refills on file. She will fill the medication for patient. Nothing further needed from our office.

## 2017-10-02 ENCOUNTER — OFFICE VISIT (OUTPATIENT)
Dept: FAMILY MEDICINE CLINIC | Facility: CLINIC | Age: 32
End: 2017-10-02

## 2017-10-02 ENCOUNTER — APPOINTMENT (OUTPATIENT)
Dept: LAB | Age: 32
End: 2017-10-02
Attending: EMERGENCY MEDICINE
Payer: COMMERCIAL

## 2017-10-02 VITALS
WEIGHT: 173 LBS | HEIGHT: 66 IN | BODY MASS INDEX: 27.8 KG/M2 | RESPIRATION RATE: 16 BRPM | HEART RATE: 76 BPM | DIASTOLIC BLOOD PRESSURE: 68 MMHG | TEMPERATURE: 98 F | OXYGEN SATURATION: 98 % | SYSTOLIC BLOOD PRESSURE: 110 MMHG

## 2017-10-02 DIAGNOSIS — E03.9 HYPOTHYROIDISM, UNSPECIFIED TYPE: Primary | ICD-10-CM

## 2017-10-02 DIAGNOSIS — E06.3 HYPOTHYROIDISM, ACQUIRED, AUTOIMMUNE: ICD-10-CM

## 2017-10-02 DIAGNOSIS — F41.9 ANXIETY: ICD-10-CM

## 2017-10-02 DIAGNOSIS — R07.89 ATYPICAL CHEST PAIN: ICD-10-CM

## 2017-10-02 PROCEDURE — 84443 ASSAY THYROID STIM HORMONE: CPT

## 2017-10-02 PROCEDURE — 36415 COLL VENOUS BLD VENIPUNCTURE: CPT

## 2017-10-02 PROCEDURE — 84439 ASSAY OF FREE THYROXINE: CPT

## 2017-10-02 PROCEDURE — 99214 OFFICE O/P EST MOD 30 MIN: CPT | Performed by: EMERGENCY MEDICINE

## 2017-10-02 NOTE — PROGRESS NOTES
Chief Complaint:   Patient presents with: Follow - Up: f/u cardiac testing     HPI:   This is a 27year old female here for ff up of atypical chest pain. Follows up today saying she is feeling well.   No new symptoms has been asymptomatic since 1 month ag Sitting, Cuff Size: adult)   Pulse 76   Temp 97.9 °F (36.6 °C) (Oral)   Resp 16   Ht 66\"   Wt 173 lb   LMP 09/15/2017   SpO2 98%   BMI 27.92 kg/m²  Estimated body mass index is 27.92 kg/m² as calculated from the following:    Height as of this encounter:

## 2017-10-02 NOTE — PATIENT INSTRUCTIONS
Thank you for choosing [facility]  To Do:  FOR MARTHA PEARSON  1. Have TSH drawn today. If levels are normal, will repeat in 6 months  2. FF up in 6 months or as needed  3.  Flu shot recommended, return for nurse visit if you change your mind day.  · Don’t take products that contain iron and calcium or antacids within 4 hours of taking your thyroid hormone pills.   · Don’t take other medicines with your thyroid hormone pill without checking with your provider first.  · Tell your provider if you

## 2017-10-04 ENCOUNTER — TELEPHONE (OUTPATIENT)
Dept: FAMILY MEDICINE CLINIC | Facility: CLINIC | Age: 32
End: 2017-10-04

## 2017-10-04 DIAGNOSIS — E03.9 HYPOTHYROIDISM, UNSPECIFIED TYPE: Primary | ICD-10-CM

## 2017-10-04 NOTE — TELEPHONE ENCOUNTER
Patient aware of lab results. Advised to have TSH check again in 6 months. Follow up in the office in 6 months. Verbalized understanding.

## 2017-10-04 NOTE — TELEPHONE ENCOUNTER
----- Message from Karen Gutierres MD sent at 10/2/2017  9:36 PM CDT -----  TSH stable  Continue with present dose of levothyroxine  FF up in 6 months  TSH in 6 months

## 2017-10-30 ENCOUNTER — TELEPHONE (OUTPATIENT)
Dept: FAMILY MEDICINE CLINIC | Facility: CLINIC | Age: 32
End: 2017-10-30

## 2017-10-30 NOTE — TELEPHONE ENCOUNTER
----- Message from Rema Gomez MD sent at 10/28/2017  2:06 PM CDT -----  Unremarkable 2D echo.   No change from previous

## 2017-11-30 RX ORDER — LEVOTHYROXINE SODIUM 0.07 MG/1
TABLET ORAL
Qty: 30 TABLET | Refills: 5 | Status: SHIPPED | OUTPATIENT
Start: 2017-11-30 | End: 2018-03-14

## 2018-03-14 PROBLEM — F90.9 ENCOUNTER FOR MEDICATION MANAGEMENT IN ATTENTION DEFICIT HYPERACTIVITY DISORDER (ADHD): Status: ACTIVE | Noted: 2018-03-14

## 2018-03-14 PROBLEM — Z79.899 ENCOUNTER FOR MEDICATION MANAGEMENT IN ATTENTION DEFICIT HYPERACTIVITY DISORDER (ADHD): Status: ACTIVE | Noted: 2018-03-14

## 2018-03-14 NOTE — PATIENT INSTRUCTIONS
Thank you for choosing [facility]  To Do:  FOR MARTHA PEARSON  Start Vyvanse 20 mg daily  Follow up in 2-4 weeks sooner if worse  Watch out for palpitations increasing anxiety etc  Have blood tests done befor visit                Attention-Def Having one parent with ADHD makes it more likely you’ll have it too. And the part of your brain that controls attention may be involved. Certain brain chemicals that are out of balance may also play a role. What can be done?   The first step is finding out least part time. · Ask for a private office. · Use headphones to muffle noise. · Work on more than one project at the same time. When you get bored with one, switch to the other. · Work on boring tasks when you feel most alert.   · Have a schedule for e

## 2018-03-14 NOTE — PROGRESS NOTES
Chief Complaint:   Patient presents with: Anxiety    HPI:   This is a 27year old female     [facility] edgy all the time. Since mom passed has been very edgy and easily angered. Guillermo Gipson at child easily. Feels bad about being hard on daughter.  Fusses ab been completed  YES    Often interrupts or intrudes on others (e.g., butts into conversations or games)  YES          [facility]       Past Medical History:   Diagnosis Date   • Gall stones    • Hypothyroidism 4/2017     Past Surgical History:  2009: Marylin George well developed, well nourished, well hydrated, no distress  SKIN: good skin turgor, no obvious rashes      MENTAL STATUS EXAMINATION: The patient is alert and oriented. Dress and hygiene are fair. Looks his stated age. Calm and cooperative.  Good eye contac plan of care and I have made it clear that no refills of any medication will be given without appropriate follow-up and continued office visits as deemed necessary.   I have explained that stimulant medication is of high risk and always has the potential of

## 2018-04-10 ENCOUNTER — OFFICE VISIT (OUTPATIENT)
Dept: FAMILY MEDICINE CLINIC | Facility: CLINIC | Age: 33
End: 2018-04-10

## 2018-04-10 ENCOUNTER — APPOINTMENT (OUTPATIENT)
Dept: LAB | Age: 33
End: 2018-04-10
Attending: EMERGENCY MEDICINE
Payer: COMMERCIAL

## 2018-04-10 VITALS
WEIGHT: 174 LBS | OXYGEN SATURATION: 98 % | HEART RATE: 70 BPM | BODY MASS INDEX: 28 KG/M2 | RESPIRATION RATE: 16 BRPM | TEMPERATURE: 98 F | DIASTOLIC BLOOD PRESSURE: 72 MMHG | SYSTOLIC BLOOD PRESSURE: 106 MMHG

## 2018-04-10 DIAGNOSIS — E03.9 HYPOTHYROIDISM, UNSPECIFIED TYPE: Primary | ICD-10-CM

## 2018-04-10 DIAGNOSIS — F90.2 ATTENTION DEFICIT HYPERACTIVITY DISORDER (ADHD), COMBINED TYPE: ICD-10-CM

## 2018-04-10 DIAGNOSIS — E03.9 HYPOTHYROIDISM, UNSPECIFIED TYPE: ICD-10-CM

## 2018-04-10 PROCEDURE — 84443 ASSAY THYROID STIM HORMONE: CPT

## 2018-04-10 PROCEDURE — 36415 COLL VENOUS BLD VENIPUNCTURE: CPT

## 2018-04-10 PROCEDURE — 99214 OFFICE O/P EST MOD 30 MIN: CPT | Performed by: EMERGENCY MEDICINE

## 2018-04-10 NOTE — PROGRESS NOTES
Chief Complaint:   Patient presents with:  Thyroid Problem: Follow up     HPI:   This is a 27year old female     ADHD  Started low dose Vyvanse. Feels less verbally impulsive. Minimal improvement in impulsivity. Denies any tremulousness or palpitations. those stated as above    PHYSICAL EXAM:   /72   Pulse 70   Temp 97.6 °F (36.4 °C) (Oral)   Resp 16   Wt 174 lb   SpO2 98%   BMI 28.08 kg/m²  Estimated body mass index is 28.08 kg/m² as calculated from the following:    Height as of 3/14/18: 66\".

## 2018-04-10 NOTE — PATIENT INSTRUCTIONS
Thank you for choosing [facility]  To Do:  FOR MARTHA Devine  Blood test for thyroid today  Follow up in 1 month  Increase Vyvanse to 40 mg  Call if with any side effects or concerns

## 2018-04-11 ENCOUNTER — TELEPHONE (OUTPATIENT)
Dept: FAMILY MEDICINE CLINIC | Facility: CLINIC | Age: 33
End: 2018-04-11

## 2018-04-11 NOTE — TELEPHONE ENCOUNTER
----- Message from Anil Osborn MD sent at 4/11/2018 11:28 AM CDT -----  tsh stable  Continue with present dose of levothyroxine  Repeat TSH in 3 months

## 2018-05-10 NOTE — PROGRESS NOTES
Chief Complaint:   Patient presents with:  ADHD: Vyvanse follow up     HPI:   This is a 27year old female       ADHD   On 40 mg Vyvanse, reports increased anxiety and moodiness initially. Reports fuse being very short still but improving.    Reports more Throat  Adhesive Tape (Shirley*      Current Meds:    Current Outpatient Prescriptions on File Prior to Visit:  Lisdexamfetamine Dimesylate (VYVANSE) 40 MG Oral Cap Take 1 capsule (40 mg total) by mouth every morning.  Disp: 30 capsule Rfl: 0   Levothyroxine S deficit hyperactivity disorder (ADHD), combined type  -     Lisdexamfetamine Dimesylate (VYVANSE) 50 MG Oral Cap; Take 1 capsule (50 mg total) by mouth every morning. Anxiety      Plan: Patient is a 61-year-old female follows up regarding ADHD.   Reports

## 2018-05-10 NOTE — PATIENT INSTRUCTIONS
Thank you for choosing [facility]  To Do:  FOR MARTHA PEARSON  1. Follow up in 2 weeks  2. Call or come in sooner if with any Sx  3. OK to stop vyvanse if you feel more anxious.   4. Arrange for counseling and therapy          [facility]

## 2018-05-22 NOTE — PROGRESS NOTES
Chief Complaint:   Patient presents with:  ADHD: Follow up Vyvanse     HPI:   This is a 26year old female       ADHD  Reports feeling better. Anxiety is much better. Still \"snippy\" with children but not asmuch. Still \"reactionary\" but better.  Better prior to visit. Counseling given: Not Answered         PROBLEM LIST     Patient Active Problem List:     Functional GI symptoms     Exposure to TB     Abnormal CT scan     Overweight (BMI 25.0-29. 9)     Anxiety     Encounter for medication management in for therapy  Follow up in 5-6 weeks      FOLLOW UP: 5-6 weeks     I spent a total of 25 mins   regarding her medications, treatment options and discussion of her condition and plan of care.

## 2018-05-22 NOTE — PATIENT INSTRUCTIONS
Thank you for choosing [address] [place] [address] Group  To Do:  FOR MARTHA PEARSON  1. 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 Continue with Vyvance  3. Arrange for therapy  4. Follow up in 5-6 weeks  1.      Joselin Smith, MORISW  Behavioral H Treatment will depend on how severe the damage is. Small holes often heal on their own. A small patch may be placed over a minor eardrum tear. Large tears may need to be repaired during an operation.  If you are very dizzy or have severe hearing loss, you a

## 2018-05-27 PROBLEM — H72.2X1 TYMPANIC MEMBRANE PERFORATION, MARGINAL, RIGHT: Status: ACTIVE | Noted: 2018-05-27

## 2018-06-07 RX ORDER — LEVOTHYROXINE SODIUM 0.07 MG/1
TABLET ORAL
Qty: 30 TABLET | Refills: 0 | Status: SHIPPED | OUTPATIENT
Start: 2018-06-07 | End: 2018-06-20

## 2018-06-07 NOTE — TELEPHONE ENCOUNTER
Medication(s) to Refill:   Pending Prescriptions Disp Refills    LEVOTHYROXINE SODIUM 75 MCG Oral Tab [Pharmacy Med Name: LEVOTHYROXINE 0.075MG (75MCG) TABS] 30 tablet 0     Sig: TAKE ONE TABLET BY MOUTH BEFORE BREAKFAST             Reason for Medication R

## 2018-06-07 NOTE — TELEPHONE ENCOUNTER
Pt called about this, she is requesting refills early because she picked up a 3 mo supply, was cleaning out her car later the same day, and accidentally threw away the whole thing.  Pharmacy needs refills, and likely will need a prior authorization for moustapha

## 2018-06-20 PROCEDURE — 87624 HPV HI-RISK TYP POOLED RSLT: CPT | Performed by: OBSTETRICS & GYNECOLOGY

## 2018-06-20 PROCEDURE — 88175 CYTOPATH C/V AUTO FLUID REDO: CPT | Performed by: OBSTETRICS & GYNECOLOGY

## 2018-06-25 ENCOUNTER — TELEPHONE (OUTPATIENT)
Dept: FAMILY MEDICINE CLINIC | Facility: CLINIC | Age: 33
End: 2018-06-25

## 2018-06-25 DIAGNOSIS — F90.2 ATTENTION DEFICIT HYPERACTIVITY DISORDER (ADHD), COMBINED TYPE: ICD-10-CM

## 2018-06-25 NOTE — TELEPHONE ENCOUNTER
The patient called for a refill on her Lisdexamfetamine Dimesylate (VYVANSE) 50 MG Oral Cap. Take 1 capsule (50 mg total) by mouth every morning. She has 10 pills left, she also scheduled an appointment to see Dr. Joselin Stack on 7/24/18.

## 2018-06-25 NOTE — TELEPHONE ENCOUNTER
LF: 5/28/18 LOV: 5/22/18  Patient has an appointment scheduled for 7/24/18. Please approve or deny pending Rx. Thank you!

## 2018-07-03 RX ORDER — LEVOTHYROXINE SODIUM 0.07 MG/1
TABLET ORAL
Qty: 30 TABLET | Refills: 2 | Status: SHIPPED | OUTPATIENT
Start: 2018-07-03 | End: 2018-08-06

## 2018-08-06 ENCOUNTER — OFFICE VISIT (OUTPATIENT)
Dept: FAMILY MEDICINE CLINIC | Facility: CLINIC | Age: 33
End: 2018-08-06
Payer: COMMERCIAL

## 2018-08-06 VITALS
RESPIRATION RATE: 15 BRPM | DIASTOLIC BLOOD PRESSURE: 70 MMHG | HEART RATE: 59 BPM | BODY MASS INDEX: 24 KG/M2 | OXYGEN SATURATION: 98 % | SYSTOLIC BLOOD PRESSURE: 106 MMHG | WEIGHT: 150 LBS

## 2018-08-06 DIAGNOSIS — E03.9 HYPOTHYROIDISM, UNSPECIFIED TYPE: ICD-10-CM

## 2018-08-06 DIAGNOSIS — F90.2 ATTENTION DEFICIT HYPERACTIVITY DISORDER (ADHD), COMBINED TYPE: ICD-10-CM

## 2018-08-06 PROCEDURE — 99214 OFFICE O/P EST MOD 30 MIN: CPT | Performed by: EMERGENCY MEDICINE

## 2018-08-06 RX ORDER — LEVOTHYROXINE SODIUM 0.07 MG/1
TABLET ORAL
Qty: 30 TABLET | Refills: 2 | Status: SHIPPED | OUTPATIENT
Start: 2018-08-06 | End: 2018-12-03

## 2018-08-06 NOTE — PROGRESS NOTES
Chief Complaint:   Patient presents with:  ADHD: Vyvanse follow up     HPI:   This is a 26year old female       ADHD  On Vyvanse 50 mg. Feels more \"level headed\". Not as reactionary. Believes that Vyvanse has helped. Minimal anxiety. No depressive Sx.  Fe marginal, right        REVIEW OF SYSTEMS:   A comprehensive 10 point review of systems was completed. Pertinent positives and negatives noted in the the HPI.       PHYSICAL EXAM:   /70   Pulse 59   Resp 15   Wt 150 lb   SpO2 98%   BMI 24.21 kg/m²  Es Lisdexamfetamine Dimesylate (VYVANSE) 60 MG Oral Cap; Take 1 capsule (60 mg total) by mouth every morning. Hypothyroidism, unspecified type  TSH 4/2018 stable.  Ok to repeat in 6 months  -     Levothyroxine Sodium 75 MCG Oral Tab; TAKE 1 TABLET(75 MCG) B

## 2018-08-06 NOTE — PATIENT INSTRUCTIONS
Thank you for choosing [facility]  To Do:  FOR MARTHA PEARSON    · Keep a symptom diary  · Follow up in 1 month  · Increase Vyvanse to 60 mg  · Call if with any problems with medications  · Continue with current dose of levothyroxine.  Repeat T

## 2018-08-29 ENCOUNTER — TELEPHONE (OUTPATIENT)
Dept: FAMILY MEDICINE CLINIC | Facility: CLINIC | Age: 33
End: 2018-08-29

## 2018-08-29 NOTE — TELEPHONE ENCOUNTER
The patient had a recent dose increase of Lisdexamfetamine Dimesylate (VYVANSE) 60 MG Oral Cap, she is feeling more anxious and thinks that it is due to the increase. She is looking for advise.

## 2018-08-29 NOTE — TELEPHONE ENCOUNTER
Spoke with pt. Pt states that she has felt more anxious and jittery since starting the 60 mg of vyvanse. Pt states since the dose increase she feels like she is on the edge of having a panic attack. Pt has felt this way since the dose was increased.  Pt did

## 2018-08-31 NOTE — TELEPHONE ENCOUNTER
Detailed VM left for patient. Advised her that medication dose can be decreased to 50mg.  I explained that I can't say for sure that her insurance will cover this being she just filled a script.l I asked her to call the office back and let us know if she wo

## 2018-09-04 NOTE — PATIENT INSTRUCTIONS
Thank you for choosing [facility]  To Do:  FOR MARTHA PEARSON    · Start Lexapro  · Follow up in 1 month  · Srop Vyvanse  · Arrange for neuropsyche testing  · Arrange for counseling, Gurpreet Gore 42 you better deal with anxiety. Other forms of therapy  Other therapy methods may work better for you than CBT. Or, you may move from CBT to another form of therapy as your treatment needs change.  This may mean meeting with a therapist by yourself or in a g it.  · Avoid caffeine and nicotine, which can make anxiety symptoms worse. · Fight the temptation to turn to alcohol or unprescribed drugs for relief. They only make things worse in the long run.    Date Last Reviewed: 1/1/2017  © 2308-3253 [employer]

## 2018-09-04 NOTE — PROGRESS NOTES
Chief Complaint:   Patient presents with:  ADHD: Vyvanse follow up, feeling anxious     HPI:   This is a 26year old female       FOLLOW UP ADHD    D/C Vyvanse because of increased anxiety. Worried more about the anxiety.  Felt more jittery on medici No current facility-administered medications on file prior to visit. Counseling given: Not Answered         PROBLEM LIST     Patient Active Problem List:     Functional GI symptoms     Exposure to TB     Abnormal CT scan     Overweight (BMI 25.0-29. medications. · Risks and benefits of medication(s) discussed in detail, including possible worsening of anxiety. If severe alterations in mentation occur patient instructed to stop medication and call office immediately.   · Patient will call if any sympto

## 2018-09-25 DIAGNOSIS — F41.1 GENERALIZED ANXIETY DISORDER: ICD-10-CM

## 2018-09-25 RX ORDER — ESCITALOPRAM OXALATE 10 MG/1
10 TABLET ORAL DAILY
Qty: 30 TABLET | Refills: 0 | Status: SHIPPED | OUTPATIENT
Start: 2018-09-25 | End: 2018-10-04

## 2018-09-25 NOTE — TELEPHONE ENCOUNTER
Patient is going out of town and is requesting a refill for Escitalopram 10mg (Lexapro). Patient is scheduled for a follow up on 10/4/18.

## 2018-09-25 NOTE — TELEPHONE ENCOUNTER
Spoke with pt. Pt thinks that she will be about 1 pill short before her OV. Pt scheduled for OV 10/4/18.   LOV:9/4/18  LF:9/4/18  Please approve or deny pending Rx request.   Thank you

## 2018-10-04 NOTE — PATIENT INSTRUCTIONS
Thank you for choosing [facility]  To Do:  FOR MARTHA PEARSON    · [facility] to follow up in 3 months, sooner if with any concerns or changes  · Continue with LExapro 10 mg  · Have blood test for thyroid done in 1 month, November  ·         Treating while on this medicine, until you know how it affects you. Caution  Never use alcohol or other drugs with anti-anxiety medicines. This could result in loss of muscular control, sedation, coma, or death.  Also, use only the amount of medicine prescribed for some side effects. · Sexual problems. Some antidepressants can affect your desire for sex or your ability to have an orgasm. A change in dosage or medicine often solves the problem.  If you have a sexual side effect that concerns you, tell your healthcare advised. · If tests do not determine whether your nodule is benign or malignant, surgery may be advised. · If tests show that the nodule is definitely malignant, surgery will probably be advised.   The best survival rates from lung cancer occur when the o

## 2018-10-04 NOTE — PROGRESS NOTES
Chief Complaint:   Patient presents with: Anxiety: Med follow up     HPI:   This is a 26year old female         ANXIETY  Feels much better. Calmer with children. Not so reactive. Not constantly worrying. NOt as crabby. Feels happier. Sleeping well. 25.0-29. 9)     Anxiety     Tympanic membrane perforation, marginal, right        REVIEW OF SYSTEMS:   Review of systems significant for decreased anxiety  The rest of the review of systems is negative except those stated as above    PHYSICAL EXAM:    simon. Reports more tolerance with her children. Okay to follow-up in 3 months. Continue with Lexapro. She has a history of a pulmonary nodule will recheck with low-dose CT scan. Last TSH was within normal limits, she is euthyroid.   We will recheck in

## 2018-12-03 DIAGNOSIS — E03.9 HYPOTHYROIDISM, UNSPECIFIED TYPE: ICD-10-CM

## 2018-12-03 RX ORDER — LEVOTHYROXINE SODIUM 0.07 MG/1
TABLET ORAL
Qty: 30 TABLET | Refills: 0 | Status: SHIPPED | OUTPATIENT
Start: 2018-12-03 | End: 2018-12-28

## 2018-12-28 ENCOUNTER — APPOINTMENT (OUTPATIENT)
Dept: LAB | Age: 33
End: 2018-12-28
Attending: EMERGENCY MEDICINE
Payer: COMMERCIAL

## 2018-12-28 DIAGNOSIS — E03.9 HYPOTHYROIDISM, UNSPECIFIED TYPE: ICD-10-CM

## 2018-12-28 LAB — TSI SER-ACNC: 1.18 MIU/ML (ref 0.35–5.5)

## 2018-12-28 PROCEDURE — 84443 ASSAY THYROID STIM HORMONE: CPT

## 2018-12-28 PROCEDURE — 36415 COLL VENOUS BLD VENIPUNCTURE: CPT

## 2018-12-28 RX ORDER — LEVOTHYROXINE SODIUM 0.07 MG/1
75 TABLET ORAL
Qty: 30 TABLET | Refills: 0 | Status: SHIPPED | OUTPATIENT
Start: 2018-12-28 | End: 2019-01-26

## 2018-12-28 NOTE — TELEPHONE ENCOUNTER
Patient needs the following medication refilled at the [facility]:  LEVOTHYROXINE SODIUM 75 MCG Oral Tab 30 tablet 0 12/3/2018     Sig: TAKE 1 TABLET(75 MCG) BY MOUTH BEFORE BREAKFAST      Patient is coming to the lab today after she feeds her children and

## 2018-12-31 ENCOUNTER — TELEPHONE (OUTPATIENT)
Dept: FAMILY MEDICINE CLINIC | Facility: CLINIC | Age: 33
End: 2018-12-31

## 2018-12-31 NOTE — TELEPHONE ENCOUNTER
Called pt and received no answer. VM is full. Will bijan for follow up call. PSR: OK to lync me when pt calls back. Thank you!

## 2018-12-31 NOTE — TELEPHONE ENCOUNTER
----- Message from Tung Tamayo MD sent at 12/30/2018 11:21 AM CST -----  TSH stable, continue current dose of levothyroxine  Pt due for follow up re: anxiety

## 2019-01-02 NOTE — TELEPHONE ENCOUNTER
2nd attempt. Called pt and received no answer. VM is full. Letter mailed to pt regarding normal results/instructions and request for follow up. PSR: OK to lync me if / when pt calls back. Thank you!

## 2019-01-26 DIAGNOSIS — E03.9 HYPOTHYROIDISM, UNSPECIFIED TYPE: ICD-10-CM

## 2019-01-26 DIAGNOSIS — F41.1 GENERALIZED ANXIETY DISORDER: ICD-10-CM

## 2019-01-26 DIAGNOSIS — F41.9 ANXIETY: ICD-10-CM

## 2019-01-28 RX ORDER — LEVOTHYROXINE SODIUM 0.07 MG/1
TABLET ORAL
Qty: 30 TABLET | Refills: 0 | Status: SHIPPED | OUTPATIENT
Start: 2019-01-28 | End: 2019-03-01

## 2019-01-29 RX ORDER — ESCITALOPRAM OXALATE 10 MG/1
TABLET ORAL
Qty: 90 TABLET | Refills: 0 | Status: SHIPPED | OUTPATIENT
Start: 2019-01-29 | End: 2019-04-28

## 2019-03-01 DIAGNOSIS — E03.9 HYPOTHYROIDISM, UNSPECIFIED TYPE: ICD-10-CM

## 2019-03-01 RX ORDER — LEVOTHYROXINE SODIUM 0.07 MG/1
TABLET ORAL
Qty: 30 TABLET | Refills: 0 | Status: SHIPPED | OUTPATIENT
Start: 2019-03-01 | End: 2019-04-01

## 2019-04-01 DIAGNOSIS — E03.9 HYPOTHYROIDISM, UNSPECIFIED TYPE: ICD-10-CM

## 2019-04-01 RX ORDER — LEVOTHYROXINE SODIUM 0.07 MG/1
TABLET ORAL
Qty: 90 TABLET | Refills: 0 | Status: SHIPPED | OUTPATIENT
Start: 2019-04-01 | End: 2019-06-26

## 2019-04-28 DIAGNOSIS — F41.9 ANXIETY: ICD-10-CM

## 2019-04-28 DIAGNOSIS — F41.1 GENERALIZED ANXIETY DISORDER: ICD-10-CM

## 2019-04-29 RX ORDER — ESCITALOPRAM OXALATE 10 MG/1
TABLET ORAL
Qty: 30 TABLET | Refills: 0 | Status: SHIPPED | OUTPATIENT
Start: 2019-04-29 | End: 2019-06-01

## 2019-04-30 NOTE — TELEPHONE ENCOUNTER
Unable to contact patient and let her know medication was approved and sent to her pharmacy with no refills. Patients mailbox is full and will not take any messages.

## 2019-06-01 DIAGNOSIS — F41.1 GENERALIZED ANXIETY DISORDER: ICD-10-CM

## 2019-06-01 DIAGNOSIS — F41.9 ANXIETY: ICD-10-CM

## 2019-06-03 RX ORDER — ESCITALOPRAM OXALATE 10 MG/1
TABLET ORAL
Qty: 30 TABLET | Refills: 0 | Status: SHIPPED | OUTPATIENT
Start: 2019-06-03 | End: 2019-07-02

## 2019-06-03 NOTE — TELEPHONE ENCOUNTER
Patient VM is no longer full. LVM for patient that she is needing an office appointment prior to any further refills.

## 2019-06-26 DIAGNOSIS — E03.9 HYPOTHYROIDISM, UNSPECIFIED TYPE: ICD-10-CM

## 2019-06-26 RX ORDER — LEVOTHYROXINE SODIUM 0.07 MG/1
TABLET ORAL
Qty: 90 TABLET | Refills: 0 | Status: SHIPPED | OUTPATIENT
Start: 2019-06-26 | End: 2019-09-24

## 2019-06-26 NOTE — TELEPHONE ENCOUNTER
Medication(s) to Refill:   Requested Prescriptions     Pending Prescriptions Disp Refills   • LEVOTHYROXINE SODIUM 75 MCG Oral Tab [Pharmacy Med Name: LEVOTHYROXINE 0.075MG (75MCG) TABS] 90 tablet 0     Sig: TAKE 1 TABLET(75 MCG) BY MOUTH BEFORE BREAKFAST

## 2019-07-02 NOTE — PROGRESS NOTES
Chief Complaint:   Patient presents with: Anxiety: Med f/u     HPI:   This is a 37year old female       On escitaloram x 6 months. Feels happier. Less reactive, more patient with kids. Not mean.   Feels like she is at goal.  Denies any depressive sympt Anxiety     Tympanic membrane perforation, marginal, right        REVIEW OF SYSTEMS:   Review of systems significant for weight gain  The rest of the review of systems is negative except those stated as above    PHYSICAL EXAM:   /72   Pulse 50   Re and immunity disorder  - CBC WITH DIFFERENTIAL WITH PLATELET  - COMP METABOLIC PANEL (14)  - LIPID PANEL    5.  Encounter for vitamin deficiency screening  - VITAMIN D, 25-HYDROXY        PATIENT INSTRUCTIONS:    · Follow up in 6 months for recheck and annua

## 2019-07-02 NOTE — PATIENT INSTRUCTIONS
Thank you for choosing [facility]  To Do:  FOR MARTHA PEARSON    · Follow up in 6 months for recheck and annual physical  · Continue with Lexapro  · Blood test after fasting  · Follow up with [facility]  · Avoid water in ear              Dr. Shree Amaya medicine  · Take your thyroid hormone pills as prescribed by your healthcare provider. This is most often 1 pill a day on an empty stomach. Use a pillbox labeled with the days of the week. This will help you remember to take your pill each day.   · Don’t ta

## 2019-07-04 PROBLEM — H72.91 PERFORATION OF RIGHT TYMPANIC MEMBRANE: Status: ACTIVE | Noted: 2019-07-04

## 2019-07-04 PROBLEM — E06.3 HYPOTHYROIDISM DUE TO HASHIMOTO'S THYROIDITIS: Status: ACTIVE | Noted: 2019-07-04

## 2019-07-04 PROBLEM — E03.8 HYPOTHYROIDISM DUE TO HASHIMOTO'S THYROIDITIS: Status: ACTIVE | Noted: 2019-07-04

## 2019-09-24 DIAGNOSIS — E03.9 HYPOTHYROIDISM, UNSPECIFIED TYPE: ICD-10-CM

## 2019-09-24 RX ORDER — LEVOTHYROXINE SODIUM 0.07 MG/1
TABLET ORAL
Qty: 90 TABLET | Refills: 0 | Status: SHIPPED | OUTPATIENT
Start: 2019-09-24 | End: 2019-12-28

## 2019-12-27 DIAGNOSIS — E03.9 HYPOTHYROIDISM, UNSPECIFIED TYPE: ICD-10-CM

## 2019-12-28 RX ORDER — LEVOTHYROXINE SODIUM 0.07 MG/1
TABLET ORAL
Qty: 90 TABLET | Refills: 0 | Status: SHIPPED | OUTPATIENT
Start: 2019-12-28 | End: 2020-03-16

## 2019-12-30 DIAGNOSIS — F41.9 ANXIETY: ICD-10-CM

## 2019-12-30 RX ORDER — ESCITALOPRAM OXALATE 10 MG/1
TABLET ORAL
Qty: 90 TABLET | Refills: 0 | Status: SHIPPED | OUTPATIENT
Start: 2019-12-30 | End: 2020-03-16

## 2019-12-30 NOTE — TELEPHONE ENCOUNTER
Please call patient and schedule OV. Patient is overdue.  30 days was given and will need to be seen for additional.

## 2020-03-13 DIAGNOSIS — E03.9 HYPOTHYROIDISM, UNSPECIFIED TYPE: ICD-10-CM

## 2020-03-13 DIAGNOSIS — F41.9 ANXIETY: ICD-10-CM

## 2020-03-16 RX ORDER — ESCITALOPRAM OXALATE 10 MG/1
TABLET ORAL
Qty: 30 TABLET | Refills: 0 | Status: SHIPPED | OUTPATIENT
Start: 2020-03-16 | End: 2020-04-20

## 2020-03-16 RX ORDER — LEVOTHYROXINE SODIUM 0.07 MG/1
TABLET ORAL
Qty: 30 TABLET | Refills: 0 | Status: SHIPPED | OUTPATIENT
Start: 2020-03-16 | End: 2020-04-20

## 2020-04-18 DIAGNOSIS — E03.9 HYPOTHYROIDISM, UNSPECIFIED TYPE: ICD-10-CM

## 2020-04-18 DIAGNOSIS — F41.9 ANXIETY: ICD-10-CM

## 2020-04-20 RX ORDER — ESCITALOPRAM OXALATE 10 MG/1
TABLET ORAL
Qty: 30 TABLET | Refills: 0 | Status: SHIPPED | OUTPATIENT
Start: 2020-04-20 | End: 2020-05-27

## 2020-04-20 RX ORDER — LEVOTHYROXINE SODIUM 0.07 MG/1
TABLET ORAL
Qty: 30 TABLET | Refills: 0 | Status: SHIPPED | OUTPATIENT
Start: 2020-04-20 | End: 2020-05-27

## 2020-04-21 NOTE — PROGRESS NOTES
Virtual Telephone Check-In    [facility] verbally consents to a Virtual/Telephone Check-In visit on 04/21/20. Patient understands and accepts financial responsibility for any deductible, co-insurance and/or co-pays associated with this service.

## 2020-05-26 DIAGNOSIS — E03.9 HYPOTHYROIDISM, UNSPECIFIED TYPE: ICD-10-CM

## 2020-05-26 DIAGNOSIS — F41.9 ANXIETY: ICD-10-CM

## 2020-05-26 NOTE — TELEPHONE ENCOUNTER
Medication(s) to Refill:   Requested Prescriptions     Pending Prescriptions Disp Refills   • ESCITALOPRAM 10 MG Oral Tab [Pharmacy Med Name: ESCITALOPRAM 10MG TABLETS] 30 tablet 0     Sig: TAKE 1 TABLET(10 MG) BY MOUTH EVERY DAY   • LEVOTHYROXINE SODIUM 7

## 2020-05-27 RX ORDER — LEVOTHYROXINE SODIUM 0.07 MG/1
TABLET ORAL
Qty: 30 TABLET | Refills: 0 | Status: SHIPPED | OUTPATIENT
Start: 2020-05-27 | End: 2020-07-07

## 2020-05-27 RX ORDER — ESCITALOPRAM OXALATE 10 MG/1
TABLET ORAL
Qty: 30 TABLET | Refills: 0 | Status: SHIPPED | OUTPATIENT
Start: 2020-05-27 | End: 2020-07-07

## 2020-07-03 DIAGNOSIS — E03.9 HYPOTHYROIDISM, UNSPECIFIED TYPE: ICD-10-CM

## 2020-07-03 DIAGNOSIS — F41.9 ANXIETY: ICD-10-CM

## 2020-07-07 ENCOUNTER — TELEPHONE (OUTPATIENT)
Dept: FAMILY MEDICINE CLINIC | Facility: CLINIC | Age: 35
End: 2020-07-07

## 2020-07-07 RX ORDER — ESCITALOPRAM OXALATE 10 MG/1
TABLET ORAL
Qty: 30 TABLET | Refills: 0 | Status: SHIPPED | OUTPATIENT
Start: 2020-07-07 | End: 2020-07-20 | Stop reason: ALTCHOICE

## 2020-07-07 RX ORDER — LEVOTHYROXINE SODIUM 0.07 MG/1
TABLET ORAL
Qty: 30 TABLET | Refills: 0 | Status: SHIPPED | OUTPATIENT
Start: 2020-07-07 | End: 2020-08-03

## 2020-07-07 NOTE — TELEPHONE ENCOUNTER
Pt left voice mail she is going to the lab tomorrow. She needs Dr. Belen Bhatt to put a her thyroid labs orders in.

## 2020-07-08 ENCOUNTER — LAB ENCOUNTER (OUTPATIENT)
Dept: LAB | Age: 35
End: 2020-07-08
Attending: EMERGENCY MEDICINE
Payer: COMMERCIAL

## 2020-07-08 ENCOUNTER — TELEPHONE (OUTPATIENT)
Dept: FAMILY MEDICINE CLINIC | Facility: CLINIC | Age: 35
End: 2020-07-08

## 2020-07-08 DIAGNOSIS — Z13.29 SCREENING FOR ENDOCRINE, NUTRITIONAL, METABOLIC AND IMMUNITY DISORDER: ICD-10-CM

## 2020-07-08 DIAGNOSIS — Z13.0 SCREENING FOR IRON DEFICIENCY ANEMIA: ICD-10-CM

## 2020-07-08 DIAGNOSIS — Z13.0 SCREENING FOR ENDOCRINE, NUTRITIONAL, METABOLIC AND IMMUNITY DISORDER: ICD-10-CM

## 2020-07-08 DIAGNOSIS — Z13.21 ENCOUNTER FOR VITAMIN DEFICIENCY SCREENING: ICD-10-CM

## 2020-07-08 DIAGNOSIS — Z13.21 SCREENING FOR ENDOCRINE, NUTRITIONAL, METABOLIC AND IMMUNITY DISORDER: ICD-10-CM

## 2020-07-08 DIAGNOSIS — E03.8 HYPOTHYROIDISM DUE TO HASHIMOTO'S THYROIDITIS: ICD-10-CM

## 2020-07-08 DIAGNOSIS — E06.3 HYPOTHYROIDISM DUE TO HASHIMOTO'S THYROIDITIS: ICD-10-CM

## 2020-07-08 DIAGNOSIS — Z13.220 SCREENING FOR LIPID DISORDERS: ICD-10-CM

## 2020-07-08 DIAGNOSIS — Z13.228 SCREENING FOR ENDOCRINE, NUTRITIONAL, METABOLIC AND IMMUNITY DISORDER: ICD-10-CM

## 2020-07-08 DIAGNOSIS — E03.8 HYPOTHYROIDISM DUE TO HASHIMOTO'S THYROIDITIS: Primary | ICD-10-CM

## 2020-07-08 DIAGNOSIS — E06.3 HYPOTHYROIDISM DUE TO HASHIMOTO'S THYROIDITIS: Primary | ICD-10-CM

## 2020-07-08 LAB
ALBUMIN SERPL-MCNC: 3.8 G/DL (ref 3.4–5)
ALBUMIN/GLOB SERPL: 1.2 {RATIO} (ref 1–2)
ALP LIVER SERPL-CCNC: 63 U/L (ref 37–98)
ALT SERPL-CCNC: 15 U/L (ref 13–56)
ANION GAP SERPL CALC-SCNC: 4 MMOL/L (ref 0–18)
AST SERPL-CCNC: 15 U/L (ref 15–37)
BASOPHILS # BLD AUTO: 0.04 X10(3) UL (ref 0–0.2)
BASOPHILS NFR BLD AUTO: 0.7 %
BILIRUB SERPL-MCNC: 0.5 MG/DL (ref 0.1–2)
BUN BLD-MCNC: 12 MG/DL (ref 7–18)
BUN/CREAT SERPL: 14.1 (ref 10–20)
CALCIUM BLD-MCNC: 8.3 MG/DL (ref 8.5–10.1)
CHLORIDE SERPL-SCNC: 109 MMOL/L (ref 98–112)
CHOLEST SMN-MCNC: 160 MG/DL (ref ?–200)
CO2 SERPL-SCNC: 27 MMOL/L (ref 21–32)
CREAT BLD-MCNC: 0.85 MG/DL (ref 0.55–1.02)
DEPRECATED RDW RBC AUTO: 43.8 FL (ref 35.1–46.3)
EOSINOPHIL # BLD AUTO: 0.16 X10(3) UL (ref 0–0.7)
EOSINOPHIL NFR BLD AUTO: 2.9 %
ERYTHROCYTE [DISTWIDTH] IN BLOOD BY AUTOMATED COUNT: 12.4 % (ref 11–15)
GLOBULIN PLAS-MCNC: 3.2 G/DL (ref 2.8–4.4)
GLUCOSE BLD-MCNC: 98 MG/DL (ref 70–99)
HCT VFR BLD AUTO: 39.8 % (ref 35–48)
HDLC SERPL-MCNC: 49 MG/DL (ref 40–59)
HGB BLD-MCNC: 12.8 G/DL (ref 12–16)
IMM GRANULOCYTES # BLD AUTO: 0.01 X10(3) UL (ref 0–1)
IMM GRANULOCYTES NFR BLD: 0.2 %
LDLC SERPL CALC-MCNC: 96 MG/DL (ref ?–100)
LYMPHOCYTES # BLD AUTO: 1.87 X10(3) UL (ref 1–4)
LYMPHOCYTES NFR BLD AUTO: 34.4 %
M PROTEIN MFR SERPL ELPH: 7 G/DL (ref 6.4–8.2)
MCH RBC QN AUTO: 30.8 PG (ref 26–34)
MCHC RBC AUTO-ENTMCNC: 32.2 G/DL (ref 31–37)
MCV RBC AUTO: 95.7 FL (ref 80–100)
MONOCYTES # BLD AUTO: 0.44 X10(3) UL (ref 0.1–1)
MONOCYTES NFR BLD AUTO: 8.1 %
NEUTROPHILS # BLD AUTO: 2.92 X10 (3) UL (ref 1.5–7.7)
NEUTROPHILS # BLD AUTO: 2.92 X10(3) UL (ref 1.5–7.7)
NEUTROPHILS NFR BLD AUTO: 53.7 %
NONHDLC SERPL-MCNC: 111 MG/DL (ref ?–130)
OSMOLALITY SERPL CALC.SUM OF ELEC: 290 MOSM/KG (ref 275–295)
PATIENT FASTING Y/N/NP: YES
PATIENT FASTING Y/N/NP: YES
PLATELET # BLD AUTO: 214 10(3)UL (ref 150–450)
POTASSIUM SERPL-SCNC: 4.1 MMOL/L (ref 3.5–5.1)
RBC # BLD AUTO: 4.16 X10(6)UL (ref 3.8–5.3)
SODIUM SERPL-SCNC: 140 MMOL/L (ref 136–145)
TRIGL SERPL-MCNC: 73 MG/DL (ref 30–149)
TSI SER-ACNC: 3.02 MIU/ML (ref 0.36–3.74)
VLDLC SERPL CALC-MCNC: 15 MG/DL (ref 0–30)
WBC # BLD AUTO: 5.4 X10(3) UL (ref 4–11)

## 2020-07-08 PROCEDURE — 80061 LIPID PANEL: CPT

## 2020-07-08 PROCEDURE — 80053 COMPREHEN METABOLIC PANEL: CPT

## 2020-07-08 PROCEDURE — 36415 COLL VENOUS BLD VENIPUNCTURE: CPT

## 2020-07-08 PROCEDURE — 85025 COMPLETE CBC W/AUTO DIFF WBC: CPT

## 2020-07-08 PROCEDURE — 82306 VITAMIN D 25 HYDROXY: CPT

## 2020-07-08 PROCEDURE — 84443 ASSAY THYROID STIM HORMONE: CPT

## 2020-07-08 NOTE — TELEPHONE ENCOUNTER
Patient is coming in today for a lab appointment. Outpatient registration called as there are no order in the system. Previous orders have  for patient. New orders placed. Outpatient registration notified.

## 2020-07-09 ENCOUNTER — PATIENT MESSAGE (OUTPATIENT)
Dept: FAMILY MEDICINE CLINIC | Facility: CLINIC | Age: 35
End: 2020-07-09

## 2020-07-09 LAB — VIT D+METAB SERPL-MCNC: 31.7 NG/ML (ref 30–100)

## 2020-07-09 NOTE — TELEPHONE ENCOUNTER
From: Lizzy Mays  To: Leon Sanchez MD  Sent: 7/9/2020 6:28 AM CDT  Subject: Test Results Question    I have a question about TSH W REFLEX TO FREE T4 resulted on 7/8/20, 1:47 PM.    Hi Dr. Francisco Rao,     Quick question, did we test my thyroid lev

## 2020-07-20 PROBLEM — F39 MOOD DISORDER: Status: ACTIVE | Noted: 2020-07-20

## 2020-07-20 PROBLEM — F39 MOOD DISORDER (HCC): Status: ACTIVE | Noted: 2020-07-20

## 2020-07-20 NOTE — PATIENT INSTRUCTIONS
Thank you for choosing [address] [place] [address] [facility]  To Do:  FOR MARTHA PEARSON        1. Use OTC Flonase 2 sprays to each nostril daily  2. If not better follow up with [facility], Dr. Baron Bose  3. Start Wellbutrin 150 mg daily  4. Follow up in 1-2 months  5.  Consider pharmacist for help. (People with high blood pressure should not use decongestants.  They can raise blood pressure.)  · Over-the-counter antihistamines may help if allergies contributed to your sinusitis.    · Use acetaminophen or ibuprofen to control pain,

## 2020-07-20 NOTE — PROGRESS NOTES
Chief Complaint:   Patient presents with: Anxiety: Med f/u     HPI:   This is a 38year old female         ANXIETY    Was previously on Lexapro. 1 month ago stopped Lexapro because of weight gain. States that she has gained 80+ pounds.   States that wh tympanic membrane     Hypothyroidism due to Hashimoto's thyroiditis     Mood disorder (HCC)        REVIEW OF SYSTEMS:   Review of systems significant for facial pressure  The rest of the review of systems is negative except those stated as above    PHYSICA AM   Result Value Ref Range    Cholesterol, Total 160 <200 mg/dL    HDL Cholesterol 49 40 - 59 mg/dL    Triglycerides 73 30 - 149 mg/dL    LDL Cholesterol 96 <100 mg/dL    VLDL 15 0 - 30 mg/dL    Non HDL Chol 111 <130 mg/dL    FASTING Yes    VITAMIN D, 25- Dispense: 60 tablet; Refill: 1  - ENT - INTERNAL    3. Hypothyroidism due to Hashimoto's thyroiditis    Stable continue with levothyroxine  Recheck in 6 months    PATIENT INSTRUCTIONS:    1. Use OTC Flonase 2 sprays to each nostril daily  2.  If not better

## 2020-08-03 DIAGNOSIS — E03.9 HYPOTHYROIDISM, UNSPECIFIED TYPE: ICD-10-CM

## 2020-08-03 RX ORDER — LEVOTHYROXINE SODIUM 0.07 MG/1
TABLET ORAL
Qty: 30 TABLET | Refills: 0 | Status: SHIPPED | OUTPATIENT
Start: 2020-08-03 | End: 2020-09-02

## 2020-09-02 DIAGNOSIS — E03.9 HYPOTHYROIDISM, UNSPECIFIED TYPE: ICD-10-CM

## 2020-09-02 RX ORDER — LEVOTHYROXINE SODIUM 0.07 MG/1
TABLET ORAL
Qty: 30 TABLET | Refills: 0 | Status: SHIPPED | OUTPATIENT
Start: 2020-09-02 | End: 2020-09-24

## 2020-09-21 DIAGNOSIS — F39 MOOD DISORDER (HCC): ICD-10-CM

## 2020-09-21 RX ORDER — BUPROPION HYDROCHLORIDE 150 MG/1
150 TABLET ORAL DAILY
Qty: 30 TABLET | Refills: 1 | Status: SHIPPED | OUTPATIENT
Start: 2020-09-21 | End: 2020-12-02 | Stop reason: SINTOL

## 2020-09-23 DIAGNOSIS — E03.9 HYPOTHYROIDISM, UNSPECIFIED TYPE: ICD-10-CM

## 2020-09-23 NOTE — TELEPHONE ENCOUNTER
Medication(s) to Refill:   Requested Prescriptions     Pending Prescriptions Disp Refills   • LEVOTHYROXINE SODIUM 75 MCG Oral Tab [Pharmacy Med Name: LEVOTHYROXINE 0.075MG (75MCG) TABS] 30 tablet 0     Sig: TAKE 1 TABLET(75 MCG) BY MOUTH BEFORE BREAKFAST

## 2020-09-24 RX ORDER — LEVOTHYROXINE SODIUM 0.07 MG/1
75 TABLET ORAL
Qty: 30 TABLET | Refills: 3 | Status: SHIPPED | OUTPATIENT
Start: 2020-09-24 | End: 2021-01-23

## 2020-11-24 ENCOUNTER — PATIENT MESSAGE (OUTPATIENT)
Dept: FAMILY MEDICINE CLINIC | Facility: CLINIC | Age: 35
End: 2020-11-24

## 2020-11-24 DIAGNOSIS — R09.81 NASAL CONGESTION: ICD-10-CM

## 2020-11-24 DIAGNOSIS — Z20.822 EXPOSURE TO COVID-19 VIRUS: ICD-10-CM

## 2020-11-24 DIAGNOSIS — R53.83 FATIGUE, UNSPECIFIED TYPE: Primary | ICD-10-CM

## 2020-11-24 NOTE — TELEPHONE ENCOUNTER
From: Loulou Davis  To: Corinna Abbott MD  Sent: 11/24/2020 2:58 PM CST  Subject: Test Results Question    Hello, my  just tested positive for covid, I'm just wondering what the protocol is.  I know we need to quarantine for 14 days, but is i

## 2020-11-25 ENCOUNTER — APPOINTMENT (OUTPATIENT)
Dept: LAB | Age: 35
End: 2020-11-25
Attending: EMERGENCY MEDICINE
Payer: COMMERCIAL

## 2020-11-25 ENCOUNTER — TELEPHONE (OUTPATIENT)
Dept: FAMILY MEDICINE CLINIC | Facility: CLINIC | Age: 35
End: 2020-11-25

## 2020-11-25 DIAGNOSIS — Z20.822 EXPOSURE TO COVID-19 VIRUS: ICD-10-CM

## 2020-11-25 DIAGNOSIS — R09.81 NASAL CONGESTION: ICD-10-CM

## 2020-11-25 DIAGNOSIS — R53.83 FATIGUE, UNSPECIFIED TYPE: ICD-10-CM

## 2020-11-25 NOTE — TELEPHONE ENCOUNTER
Allison Winchester 15 minutes ago (8:16 AM)        Pt was exposed to covid. Her  tested positive.  Pt  Requesting a test.

## 2020-11-25 NOTE — TELEPHONE ENCOUNTER
Pt calling for status of covid test order, requesting this be processed asap as she is taking her child for testing today at [phone] and would like to be tested at same time, see TE 11-25 orders call as well

## 2020-11-25 NOTE — TELEPHONE ENCOUNTER
From: Wali Benavides  To: Vijay Domínguez MD  Sent: 11/24/2020 5:40 PM CST  Subject: Test Results Question    Hi! So since I messaged you, my daughter tested positive for covid, along with my .  I have an apt for my little one at 12:45 to be germain

## 2020-11-30 ENCOUNTER — TELEPHONE (OUTPATIENT)
Dept: FAMILY MEDICINE CLINIC | Facility: CLINIC | Age: 35
End: 2020-11-30

## 2020-11-30 NOTE — TELEPHONE ENCOUNTER
Pt had OV 7/20. She was weaned off Lexapro and instructed to start Wellbutrin. Patient did not like the Wellbutrin and stopped it. She would like to restart Lexapro. Please advise, thanks.

## 2020-11-30 NOTE — TELEPHONE ENCOUNTER
pls ask if patient would like to try something else, Duloxetine. May not cause as much weight gain with duloxetine. But not guaranteed.

## 2020-11-30 NOTE — TELEPHONE ENCOUNTER
----- Message from Dunia Sanchez MD sent at 11/30/2020 10:26 AM CST -----  + COVID  Please  on isolation and quarantine.    Observant and symptomatic Tx, conservative mgt  Please screen for emergent symptoms  Patient needs video visit for recheck

## 2020-12-02 RX ORDER — ESCITALOPRAM OXALATE 10 MG/1
10 TABLET ORAL DAILY
Qty: 30 TABLET | Refills: 1 | Status: SHIPPED | OUTPATIENT
Start: 2020-12-02 | End: 2021-01-25

## 2021-01-23 DIAGNOSIS — E03.9 HYPOTHYROIDISM, UNSPECIFIED TYPE: ICD-10-CM

## 2021-01-23 RX ORDER — LEVOTHYROXINE SODIUM 0.07 MG/1
TABLET ORAL
Qty: 30 TABLET | Refills: 3 | Status: SHIPPED | OUTPATIENT
Start: 2021-01-23 | End: 2021-06-01

## 2021-01-23 NOTE — TELEPHONE ENCOUNTER
Medication(s) to Refill:   Requested Prescriptions     Pending Prescriptions Disp Refills   • ESCITALOPRAM 10 MG Oral Tab [Pharmacy Med Name: ESCITALOPRAM 10MG TABLETS] 30 tablet 1     Sig: TAKE 1 TABLET(10 MG) BY MOUTH DAILY     Signed Prescriptions Disp

## 2021-01-25 RX ORDER — ESCITALOPRAM OXALATE 10 MG/1
10 TABLET ORAL DAILY
Qty: 30 TABLET | Refills: 0 | Status: SHIPPED | OUTPATIENT
Start: 2021-01-25 | End: 2021-03-11

## 2021-01-26 NOTE — TELEPHONE ENCOUNTER
No vm identifier, mailbox full, unable to reach by phone.   Pt send [employer] msg re below noted info

## 2021-01-28 ENCOUNTER — TELEPHONE (OUTPATIENT)
Dept: FAMILY MEDICINE CLINIC | Facility: CLINIC | Age: 36
End: 2021-01-28

## 2021-01-28 DIAGNOSIS — Z86.16 HISTORY OF COVID-19: Primary | ICD-10-CM

## 2021-01-28 NOTE — TELEPHONE ENCOUNTER
Lab order placed. Spoke with the patient via phone. Notified the patient that lab order placed. Patient verbalized understanding. Patient states that she will call and schedule the lab draw. Answered all questions at this time.

## 2021-01-28 NOTE — TELEPHONE ENCOUNTER
Spoke with the patient via phone. Clarified with the patient. Patient is requesting Covid antibody test.    Ok to order Covid antibody test? Please advise. Thank you.

## 2021-01-29 ENCOUNTER — LAB ENCOUNTER (OUTPATIENT)
Dept: LAB | Age: 36
End: 2021-01-29
Attending: EMERGENCY MEDICINE
Payer: COMMERCIAL

## 2021-01-29 DIAGNOSIS — Z86.16 HISTORY OF COVID-19: Primary | ICD-10-CM

## 2021-01-29 LAB — SARS-COV-2 IGG+IGM SERPL QL IA: REACTIVE

## 2021-01-29 PROCEDURE — 36415 COLL VENOUS BLD VENIPUNCTURE: CPT

## 2021-01-29 PROCEDURE — 86769 SARS-COV-2 COVID-19 ANTIBODY: CPT

## 2021-02-24 ENCOUNTER — PATIENT MESSAGE (OUTPATIENT)
Dept: FAMILY MEDICINE CLINIC | Facility: CLINIC | Age: 36
End: 2021-02-24

## 2021-02-24 RX ORDER — ESCITALOPRAM OXALATE 10 MG/1
TABLET ORAL
Qty: 30 TABLET | Refills: 0 | OUTPATIENT
Start: 2021-02-24

## 2021-02-24 NOTE — TELEPHONE ENCOUNTER
Medication(s) to Refill:   Requested Prescriptions     Pending Prescriptions Disp Refills   • ESCITALOPRAM 10 MG Oral Tab [Pharmacy Med Name: ESCITALOPRAM 10MG TABLETS] 30 tablet 0     Sig: TAKE 1 TABLET(10 MG) BY MOUTH DAILY         Reason for Medication

## 2021-02-24 NOTE — TELEPHONE ENCOUNTER
From: Meghann Cruz  To: Wolfgang Longoria MD  Sent: 2/24/2021 12:58 PM CST  Subject: Prescription Question    Hi, I need a refill on my thyroid medicine, I got a denial message for it, and I am almost out.  Is there anyway you guys can allow that to be

## 2021-03-08 DIAGNOSIS — J32.1 FRONTAL SINUSITIS, UNSPECIFIED CHRONICITY: ICD-10-CM

## 2021-03-09 ENCOUNTER — TELEPHONE (OUTPATIENT)
Dept: FAMILY MEDICINE CLINIC | Facility: CLINIC | Age: 36
End: 2021-03-09

## 2021-03-09 NOTE — TELEPHONE ENCOUNTER
Medication(s) to Refill:   Requested Prescriptions     Pending Prescriptions Disp Refills   • Cetirizine-Pseudoephedrine ER 5-120 MG Oral Tablet 12 Hr 60 tablet 1     Sig: Take 1 tablet by mouth 2 (two) times daily as needed for Allergies.    • escitalopram

## 2021-03-11 RX ORDER — CETIRIZINE HYDROCHLORIDE, PSEUDOEPHEDRINE HYDROCHLORIDE 5; 120 MG/1; MG/1
1 TABLET, FILM COATED, EXTENDED RELEASE ORAL 2 TIMES DAILY PRN
Qty: 60 TABLET | Refills: 1 | Status: SHIPPED | OUTPATIENT
Start: 2021-03-11

## 2021-03-11 RX ORDER — ESCITALOPRAM OXALATE 10 MG/1
10 TABLET ORAL DAILY
Qty: 30 TABLET | Refills: 0 | Status: SHIPPED | OUTPATIENT
Start: 2021-03-11 | End: 2021-03-22

## 2021-03-11 RX ORDER — ESCITALOPRAM OXALATE 10 MG/1
10 TABLET ORAL DAILY
Qty: 30 TABLET | Refills: 0 | OUTPATIENT
Start: 2021-03-11

## 2021-03-11 NOTE — TELEPHONE ENCOUNTER
Patient called to follow up on my chart message she sent. She took her last pill today for escitalopram 10 MG Oral Tab    She has a visit made for 3/22/21     Is there anyway she can get a refill since she has an appointment scheduled?  Dr Amber Gonzalez does not

## 2021-03-22 NOTE — PATIENT INSTRUCTIONS
Thank you for choosing [address] [place] [address] Group  To Do:  FOR MARTHA PEARSON        1. Follow up in November for annual physical  2. Arrange fot CT scan for pulmonary nodules  3. Continue with lexapr  4. Have blood tests done for thyroid  5. a pillbox labeled with the days of the week. This will help you remember to take your pill each day. · Don’t take products that contain iron and calcium or antacids within 4 hours of taking your thyroid hormone pills.   · Don’t take other medicines with yo thyroid disorders and their treatments. Treating hypothyroidism  Hypothyroidism is an underactive thyroid. There is no cure for this condition. But treatment can relieve most or all of your symptoms caused by the low thyroid hormone levels.  Treatment i hyperthyroidism. It’s done by taking a pill or liquid dose of radioactive iodine. This treatment destroys the thyroid cells that are making too much hormone. You may need daily thyroid hormone pills after this treatment. · Surgery.  This is done by johanain iodine at the hospital and go home the same day. The risk from the radiation to yourself and others is very small. But you may need to stay away from other people for a few days.  It's most important to stay away from children and pregnant women during this

## 2021-03-22 NOTE — PROGRESS NOTES
Chief Complaint:   Patient presents with: Anxiety: Med f/u     HPI:   This is a 38year old female       [facility]  Gained weight wityh medication, 40 #  Mood and activity level overall improved. Feels at goal with medication.   Lomas Perforation of right tympanic membrane     Hypothyroidism due to Hashimoto's thyroiditis     Mood disorder (HCC)        PHYSICAL EXAM:   /72   Pulse 81   Temp 98.1 °F (36.7 °C) (Temporal)   Resp 15   Wt 184 lb (83.5 kg)   LMP 11/10/2020   SpO2 98%

## 2021-05-30 DIAGNOSIS — E03.9 HYPOTHYROIDISM, UNSPECIFIED TYPE: ICD-10-CM

## 2021-06-01 RX ORDER — LEVOTHYROXINE SODIUM 0.07 MG/1
TABLET ORAL
Qty: 90 TABLET | Refills: 3 | Status: SHIPPED | OUTPATIENT
Start: 2021-06-01

## 2021-06-29 DIAGNOSIS — F39 MOOD DISORDER (HCC): ICD-10-CM

## 2021-06-30 RX ORDER — ESCITALOPRAM OXALATE 10 MG/1
TABLET ORAL
Qty: 90 TABLET | Refills: 0 | Status: SHIPPED | OUTPATIENT
Start: 2021-06-30 | End: 2021-10-01

## 2021-09-30 DIAGNOSIS — F39 MOOD DISORDER (HCC): ICD-10-CM

## 2021-10-01 ENCOUNTER — PATIENT MESSAGE (OUTPATIENT)
Dept: FAMILY MEDICINE CLINIC | Facility: CLINIC | Age: 36
End: 2021-10-01

## 2021-10-01 DIAGNOSIS — F39 MOOD DISORDER (HCC): ICD-10-CM

## 2021-10-01 RX ORDER — ESCITALOPRAM OXALATE 10 MG/1
10 TABLET ORAL DAILY
Qty: 90 TABLET | Refills: 0 | OUTPATIENT
Start: 2021-10-01

## 2021-10-01 RX ORDER — ESCITALOPRAM OXALATE 10 MG/1
TABLET ORAL
Qty: 90 TABLET | Refills: 0 | Status: SHIPPED | OUTPATIENT
Start: 2021-10-01 | End: 2021-12-23

## 2021-10-01 NOTE — TELEPHONE ENCOUNTER
Medication(s) to Refill:   Requested Prescriptions     Pending Prescriptions Disp Refills   • ESCITALOPRAM 10 MG Oral Tab [Pharmacy Med Name: ESCITALOPRAM 10MG TABLETS] 90 tablet 0     Sig: TAKE 1 TABLET(10 MG) BY MOUTH DAILY         Reason for Medication

## 2021-10-01 NOTE — TELEPHONE ENCOUNTER
From: Lane Pittman  To: Janette Alva MD  Sent: 10/1/2021 11:38 AM CDT  Subject: Medication     Hi there, I’m out of my lexapro (whatever the generic is that I take) I have NO more, and I need some. Can I get a refill? Thank you so much!  Hope

## 2021-12-16 ENCOUNTER — PATIENT MESSAGE (OUTPATIENT)
Dept: FAMILY MEDICINE CLINIC | Facility: CLINIC | Age: 36
End: 2021-12-16

## 2021-12-16 DIAGNOSIS — F39 MOOD DISORDER (HCC): ICD-10-CM

## 2021-12-16 RX ORDER — ESCITALOPRAM OXALATE 10 MG/1
10 TABLET ORAL DAILY
Qty: 90 TABLET | Refills: 0 | OUTPATIENT
Start: 2021-12-16

## 2021-12-16 NOTE — TELEPHONE ENCOUNTER
From: Gavi Duggan  To: Mark Walton MD  Sent: 12/16/2021 7:20 AM CST  Subject: Medicine    Good morning,     It looks like I have about 10 more of my “Lexapro. Is there anyway I could get a small refill, so I could make it over the holidays.  I w

## 2021-12-23 ENCOUNTER — PATIENT MESSAGE (OUTPATIENT)
Dept: FAMILY MEDICINE CLINIC | Facility: CLINIC | Age: 36
End: 2021-12-23

## 2021-12-23 DIAGNOSIS — F39 MOOD DISORDER (HCC): ICD-10-CM

## 2021-12-23 RX ORDER — ESCITALOPRAM OXALATE 10 MG/1
10 TABLET ORAL DAILY
Qty: 90 TABLET | Refills: 0 | Status: CANCELLED | OUTPATIENT
Start: 2021-12-23

## 2021-12-23 RX ORDER — ESCITALOPRAM OXALATE 10 MG/1
10 TABLET ORAL DAILY
Qty: 30 TABLET | Refills: 0 | Status: SHIPPED | OUTPATIENT
Start: 2021-12-23 | End: 2022-01-14 | Stop reason: ALTCHOICE

## 2021-12-23 NOTE — TELEPHONE ENCOUNTER
Pt was scheduled for annual visit 12/23/2021 She called and stated her  tested + for covid and now she has started with a runny nose. Please fill her lexapro. Pt is being rescheduled.

## 2022-01-14 ENCOUNTER — OFFICE VISIT (OUTPATIENT)
Dept: FAMILY MEDICINE CLINIC | Facility: CLINIC | Age: 37
End: 2022-01-14
Payer: COMMERCIAL

## 2022-01-14 VITALS
HEIGHT: 66 IN | HEART RATE: 88 BPM | OXYGEN SATURATION: 98 % | SYSTOLIC BLOOD PRESSURE: 112 MMHG | WEIGHT: 198.81 LBS | DIASTOLIC BLOOD PRESSURE: 70 MMHG | RESPIRATION RATE: 16 BRPM | BODY MASS INDEX: 31.95 KG/M2

## 2022-01-14 DIAGNOSIS — Z00.00 ENCOUNTER FOR ANNUAL PHYSICAL EXAMINATION EXCLUDING GYNECOLOGICAL EXAMINATION IN A PATIENT OLDER THAN 17 YEARS: Primary | ICD-10-CM

## 2022-01-14 DIAGNOSIS — F39 MOOD DISORDER (HCC): ICD-10-CM

## 2022-01-14 DIAGNOSIS — E03.9 HYPOTHYROIDISM, UNSPECIFIED TYPE: ICD-10-CM

## 2022-01-14 DIAGNOSIS — Z00.00 LABORATORY EXAMINATION ORDERED AS PART OF A ROUTINE GENERAL MEDICAL EXAMINATION: ICD-10-CM

## 2022-01-14 DIAGNOSIS — R53.83 FATIGUE, UNSPECIFIED TYPE: ICD-10-CM

## 2022-01-14 PROCEDURE — 3078F DIAST BP <80 MM HG: CPT | Performed by: EMERGENCY MEDICINE

## 2022-01-14 PROCEDURE — 99212 OFFICE O/P EST SF 10 MIN: CPT | Performed by: EMERGENCY MEDICINE

## 2022-01-14 PROCEDURE — 99395 PREV VISIT EST AGE 18-39: CPT | Performed by: EMERGENCY MEDICINE

## 2022-01-14 PROCEDURE — 3074F SYST BP LT 130 MM HG: CPT | Performed by: EMERGENCY MEDICINE

## 2022-01-14 PROCEDURE — 3008F BODY MASS INDEX DOCD: CPT | Performed by: EMERGENCY MEDICINE

## 2022-01-14 RX ORDER — ESCITALOPRAM OXALATE 10 MG/1
10 TABLET ORAL DAILY
Qty: 30 TABLET | Refills: 0 | Status: CANCELLED | OUTPATIENT
Start: 2022-01-14

## 2022-01-14 RX ORDER — FLUOXETINE HYDROCHLORIDE 40 MG/1
40 CAPSULE ORAL DAILY
Qty: 30 CAPSULE | Refills: 1 | Status: SHIPPED | OUTPATIENT
Start: 2022-01-14

## 2022-01-14 NOTE — PATIENT INSTRUCTIONS
Thank you for choosing [address] [place] [address] Group  To Do:  FOR MARTHA PEARSON        1. Ok to stop lexapro  2. Start Fluoxetine  3. Follow up in 1-2 months for recheck  4. Have blood tests done  5.  Arrange for CT scan prev ordered              Well balanced d risk or have symptoms   Depression All women in this age group At routine exams   Diabetes mellitus, type 2 Adults with no symptoms who are overweight or obese and have 1 or more other risk factors for diabetes At least every 3 years   Gonorrhea Sexually a infection – talk with your healthcare provider 1 or more doses   Pneumococcal conjugate vaccine (PCV13) and pneumococcal polysaccharide vaccine (PPSV23) Women at increased risk for infection – talk with your healthcare provider PCV13: 1 dose ages 23 to 72 intended as a substitute for professional medical care. Always follow your healthcare professional's instructions.

## 2022-01-14 NOTE — PROGRESS NOTES
Uvaldo Davis is a 43year old female who presents for a complete physical exam.   HPI:     Patient presents with:  Physical: Annual w/labs         Age: 43    1First day of last menstrual period (or first year of         menstruation, if through me c. Do you always wear seat belts? YES        d. If over [address] old, have you  had your cholesterol level checked  in the past five years? YES NO       e. Have you had a tetanus shot  the past 10 years? YES 2013       f.  Does your house have a work (Osteoporosis) Maternal Grandmother    • Diabetes Paternal Grandfather       Social History    Tobacco Use      Smoking status: Never Smoker      Smokeless tobacco: Never Used    Vaping Use      Vaping Use: Never used    Alcohol use:  Yes      Alcohol/week: constipation, diarrhea; no rectal bleeding; no heartburn  GENITAL/: no dysuria, urgency or frequency  MUSCULOSKELETAL: no joint complaints upper or lower extremities  NEURO: no sensory or motor complaint  HEMATOLOGY: denies hx anemia; denies bruising or type  Due for recheck. 4. Fatigue, unspecified type  - CBC WITH DIFFERENTIAL WITH PLATELET  - COMP METABOLIC PANEL (14)  - VITAMIN D, 25-HYDROXY  - VITAMIN B12  - PREGNANCY TEST, URINE    5.  Laboratory examination ordered as part of a routine general me and discussion of her condition and plan of care.

## 2022-01-15 ENCOUNTER — LAB ENCOUNTER (OUTPATIENT)
Dept: LAB | Age: 37
End: 2022-01-15
Attending: EMERGENCY MEDICINE
Payer: COMMERCIAL

## 2022-01-15 DIAGNOSIS — Z00.00 LABORATORY EXAMINATION ORDERED AS PART OF A ROUTINE GENERAL MEDICAL EXAMINATION: Primary | ICD-10-CM

## 2022-01-15 LAB
ALBUMIN SERPL-MCNC: 3.7 G/DL (ref 3.4–5)
ALBUMIN/GLOB SERPL: 1.2 {RATIO} (ref 1–2)
ALP LIVER SERPL-CCNC: 66 U/L
ALT SERPL-CCNC: 20 U/L
ANION GAP SERPL CALC-SCNC: 4 MMOL/L (ref 0–18)
AST SERPL-CCNC: 9 U/L (ref 15–37)
BASOPHILS # BLD AUTO: 0.05 X10(3) UL (ref 0–0.2)
BASOPHILS NFR BLD AUTO: 0.8 %
BILIRUB SERPL-MCNC: 0.8 MG/DL (ref 0.1–2)
BUN BLD-MCNC: 13 MG/DL (ref 7–18)
CALCIUM BLD-MCNC: 8.5 MG/DL (ref 8.5–10.1)
CHLORIDE SERPL-SCNC: 108 MMOL/L (ref 98–112)
CHOLEST SERPL-MCNC: 175 MG/DL (ref ?–200)
CO2 SERPL-SCNC: 26 MMOL/L (ref 21–32)
CREAT BLD-MCNC: 0.74 MG/DL
EOSINOPHIL # BLD AUTO: 0.16 X10(3) UL (ref 0–0.7)
EOSINOPHIL NFR BLD AUTO: 2.6 %
ERYTHROCYTE [DISTWIDTH] IN BLOOD BY AUTOMATED COUNT: 12.2 %
FASTING STATUS PATIENT QL REPORTED: YES
GLOBULIN PLAS-MCNC: 3 G/DL (ref 2.8–4.4)
GLUCOSE BLD-MCNC: 97 MG/DL (ref 70–99)
HCG UR QL: NEGATIVE
HCT VFR BLD AUTO: 39.8 %
HDLC SERPL-MCNC: 46 MG/DL (ref 40–59)
HGB BLD-MCNC: 12.5 G/DL
IMM GRANULOCYTES # BLD AUTO: 0.01 X10(3) UL (ref 0–1)
IMM GRANULOCYTES NFR BLD: 0.2 %
LDLC SERPL CALC-MCNC: 113 MG/DL (ref ?–100)
LYMPHOCYTES # BLD AUTO: 1.67 X10(3) UL (ref 1–4)
LYMPHOCYTES NFR BLD AUTO: 27.3 %
MCH RBC QN AUTO: 29.9 PG (ref 26–34)
MCHC RBC AUTO-ENTMCNC: 31.4 G/DL (ref 31–37)
MCV RBC AUTO: 95.2 FL
MONOCYTES # BLD AUTO: 0.43 X10(3) UL (ref 0.1–1)
MONOCYTES NFR BLD AUTO: 7 %
NEUTROPHILS # BLD AUTO: 3.79 X10 (3) UL (ref 1.5–7.7)
NEUTROPHILS # BLD AUTO: 3.79 X10(3) UL (ref 1.5–7.7)
NEUTROPHILS NFR BLD AUTO: 62.1 %
NONHDLC SERPL-MCNC: 129 MG/DL (ref ?–130)
OSMOLALITY SERPL CALC.SUM OF ELEC: 286 MOSM/KG (ref 275–295)
PLATELET # BLD AUTO: 262 10(3)UL (ref 150–450)
POTASSIUM SERPL-SCNC: 4.2 MMOL/L (ref 3.5–5.1)
PROT SERPL-MCNC: 6.7 G/DL (ref 6.4–8.2)
RBC # BLD AUTO: 4.18 X10(6)UL
SODIUM SERPL-SCNC: 138 MMOL/L (ref 136–145)
TRIGL SERPL-MCNC: 87 MG/DL (ref 30–149)
VIT B12 SERPL-MCNC: 349 PG/ML (ref 193–986)
VIT D+METAB SERPL-MCNC: 21.7 NG/ML (ref 30–100)
VLDLC SERPL CALC-MCNC: 15 MG/DL (ref 0–30)
WBC # BLD AUTO: 6.1 X10(3) UL (ref 4–11)

## 2022-01-17 ENCOUNTER — TELEPHONE (OUTPATIENT)
Dept: FAMILY MEDICINE CLINIC | Facility: CLINIC | Age: 37
End: 2022-01-17

## 2022-01-17 DIAGNOSIS — E55.9 VITAMIN D DEFICIENCY: Primary | ICD-10-CM

## 2022-01-17 RX ORDER — ERGOCALCIFEROL 1.25 MG/1
50000 CAPSULE ORAL WEEKLY
Qty: 12 CAPSULE | Refills: 0 | Status: SHIPPED | OUTPATIENT
Start: 2022-01-17

## 2022-01-17 RX ORDER — ERGOCALCIFEROL 1.25 MG/1
50000 CAPSULE ORAL WEEKLY
Qty: 12 CAPSULE | Refills: 0 | Status: SHIPPED | OUTPATIENT
Start: 2022-01-17 | End: 2022-02-16

## 2022-01-17 NOTE — TELEPHONE ENCOUNTER
----- Message from Delbert Horne MD sent at 1/16/2022  9:44 PM CST -----  Low vitamin D, needs vitamin D 50,000 units q weekly #4 with 2 refills then, OTC VIT D 7077-4691 units q daily maintenance   Repeat Vitamin D in 6 months.      Rest of labs stable

## 2022-01-17 NOTE — TELEPHONE ENCOUNTER
----- Message from Lisandro Calix MD sent at 1/16/2022  9:43 PM CST -----  TSH stable contine with current dose of levothyroxine

## 2022-01-17 NOTE — TELEPHONE ENCOUNTER
----- Message from Leonie Matamoros MD sent at 1/16/2022  9:44 PM CST -----  Low vitamin D, needs vitamin D 50,000 units q weekly #4 with 2 refills then, OTC VIT D 5153-8502 units q daily maintenance   Repeat Vitamin D in 6 months.      Rest of labs stable

## 2022-01-17 NOTE — TELEPHONE ENCOUNTER
Spoke with patient and informed her of results and instructions below, patient states understanding and did not have any additonal questions or concerns.     Script sent to [place]

## 2022-02-22 RX ORDER — ESCITALOPRAM OXALATE 10 MG/1
TABLET ORAL
Qty: 30 TABLET | Refills: 0 | OUTPATIENT
Start: 2022-02-22

## 2022-03-09 RX ORDER — FLUOXETINE HYDROCHLORIDE 40 MG/1
40 CAPSULE ORAL DAILY
Qty: 30 CAPSULE | Refills: 0 | Status: SHIPPED | OUTPATIENT
Start: 2022-03-09

## 2022-04-15 RX ORDER — FLUOXETINE HYDROCHLORIDE 40 MG/1
40 CAPSULE ORAL DAILY
Qty: 30 CAPSULE | Refills: 0 | Status: SHIPPED | OUTPATIENT
Start: 2022-04-15

## 2022-05-14 DIAGNOSIS — F39 MOOD DISORDER (HCC): ICD-10-CM

## 2022-05-14 RX ORDER — FLUOXETINE HYDROCHLORIDE 40 MG/1
40 CAPSULE ORAL DAILY
Qty: 30 CAPSULE | Refills: 0 | Status: SHIPPED | OUTPATIENT
Start: 2022-05-14 | End: 2022-06-15

## 2022-05-31 DIAGNOSIS — E03.9 HYPOTHYROIDISM, UNSPECIFIED TYPE: ICD-10-CM

## 2022-05-31 RX ORDER — LEVOTHYROXINE SODIUM 0.07 MG/1
TABLET ORAL
Qty: 90 TABLET | Refills: 0 | Status: SHIPPED | OUTPATIENT
Start: 2022-05-31

## 2022-06-12 DIAGNOSIS — F39 MOOD DISORDER (HCC): ICD-10-CM

## 2022-06-13 NOTE — TELEPHONE ENCOUNTER
Pt chking status of refill, pt states she will be going out of town tmrrw morning and needs it before then.

## 2022-06-14 ENCOUNTER — PATIENT MESSAGE (OUTPATIENT)
Dept: FAMILY MEDICINE CLINIC | Facility: CLINIC | Age: 37
End: 2022-06-14

## 2022-06-14 DIAGNOSIS — F39 MOOD DISORDER (HCC): ICD-10-CM

## 2022-06-14 RX ORDER — FLUOXETINE HYDROCHLORIDE 40 MG/1
40 CAPSULE ORAL DAILY
Qty: 30 CAPSULE | Refills: 0 | Status: CANCELLED | OUTPATIENT
Start: 2022-06-14

## 2022-06-14 RX ORDER — FLUOXETINE HYDROCHLORIDE 40 MG/1
40 CAPSULE ORAL DAILY
Qty: 30 CAPSULE | Refills: 0 | OUTPATIENT
Start: 2022-06-14

## 2022-06-14 NOTE — TELEPHONE ENCOUNTER
From: Shaka Chappell  To: Amanda Matthew MD  Sent: 6/14/2022 10:30 AM CDT  Subject: Medicine refill    Hi there, we are leaving for out of town and I need my Prozac refilled. The request was made several days ago. Just wanted to see if you guys have seen it?  Thank you for your time:     Solo Philippe

## 2022-06-14 NOTE — TELEPHONE ENCOUNTER
Pt requesting a refill of fluoxetine. She is out of town and will schedule her follow up when she comes back.   LOV 4/19/22

## 2022-06-15 RX ORDER — FLUOXETINE HYDROCHLORIDE 40 MG/1
CAPSULE ORAL
Qty: 30 CAPSULE | Refills: 0 | Status: SHIPPED | OUTPATIENT
Start: 2022-06-15

## 2022-08-26 DIAGNOSIS — E03.9 HYPOTHYROIDISM, UNSPECIFIED TYPE: ICD-10-CM

## 2022-08-27 RX ORDER — LEVOTHYROXINE SODIUM 0.07 MG/1
TABLET ORAL
Qty: 90 TABLET | Refills: 0 | Status: SHIPPED | OUTPATIENT
Start: 2022-08-27

## 2022-10-24 ENCOUNTER — PATIENT MESSAGE (OUTPATIENT)
Dept: FAMILY MEDICINE CLINIC | Facility: CLINIC | Age: 37
End: 2022-10-24

## 2022-11-09 DIAGNOSIS — E03.9 HYPOTHYROIDISM, UNSPECIFIED TYPE: ICD-10-CM

## 2022-11-09 RX ORDER — LEVOTHYROXINE SODIUM 0.07 MG/1
TABLET ORAL
Qty: 90 TABLET | Refills: 0 | Status: SHIPPED | OUTPATIENT
Start: 2022-11-09

## 2023-01-15 DIAGNOSIS — F39 MOOD DISORDER (HCC): ICD-10-CM

## 2023-01-15 DIAGNOSIS — E03.9 HYPOTHYROIDISM, UNSPECIFIED TYPE: ICD-10-CM

## 2023-01-16 RX ORDER — FLUOXETINE HYDROCHLORIDE 40 MG/1
CAPSULE ORAL
Qty: 30 CAPSULE | Refills: 0 | OUTPATIENT
Start: 2023-01-16

## 2023-01-16 RX ORDER — LEVOTHYROXINE SODIUM 0.07 MG/1
TABLET ORAL
Qty: 90 TABLET | Refills: 0 | OUTPATIENT
Start: 2023-01-16

## 2023-01-26 ENCOUNTER — OFFICE VISIT (OUTPATIENT)
Dept: FAMILY MEDICINE CLINIC | Facility: CLINIC | Age: 38
End: 2023-01-26
Payer: COMMERCIAL

## 2023-01-26 VITALS
RESPIRATION RATE: 16 BRPM | HEIGHT: 66 IN | SYSTOLIC BLOOD PRESSURE: 122 MMHG | BODY MASS INDEX: 31.82 KG/M2 | HEART RATE: 83 BPM | DIASTOLIC BLOOD PRESSURE: 72 MMHG | WEIGHT: 198 LBS | OXYGEN SATURATION: 99 %

## 2023-01-26 DIAGNOSIS — Z00.00 LABORATORY EXAMINATION ORDERED AS PART OF A ROUTINE GENERAL MEDICAL EXAMINATION: ICD-10-CM

## 2023-01-26 DIAGNOSIS — E03.9 HYPOTHYROIDISM, UNSPECIFIED TYPE: ICD-10-CM

## 2023-01-26 DIAGNOSIS — Z00.00 ENCOUNTER FOR ANNUAL PHYSICAL EXAM: Primary | ICD-10-CM

## 2023-01-26 DIAGNOSIS — F90.2 ATTENTION DEFICIT HYPERACTIVITY DISORDER (ADHD), COMBINED TYPE: ICD-10-CM

## 2023-01-26 DIAGNOSIS — F41.9 ANXIETY: ICD-10-CM

## 2023-01-26 PROCEDURE — 3078F DIAST BP <80 MM HG: CPT | Performed by: EMERGENCY MEDICINE

## 2023-01-26 PROCEDURE — 99213 OFFICE O/P EST LOW 20 MIN: CPT | Performed by: EMERGENCY MEDICINE

## 2023-01-26 PROCEDURE — 3074F SYST BP LT 130 MM HG: CPT | Performed by: EMERGENCY MEDICINE

## 2023-01-26 PROCEDURE — 99395 PREV VISIT EST AGE 18-39: CPT | Performed by: EMERGENCY MEDICINE

## 2023-01-26 PROCEDURE — 3008F BODY MASS INDEX DOCD: CPT | Performed by: EMERGENCY MEDICINE

## 2023-01-26 RX ORDER — LEVOTHYROXINE SODIUM 0.07 MG/1
75 TABLET ORAL
Qty: 90 TABLET | Refills: 1 | Status: SHIPPED | OUTPATIENT
Start: 2023-01-26

## 2023-01-26 RX ORDER — FLUOXETINE HYDROCHLORIDE 40 MG/1
40 CAPSULE ORAL DAILY
COMMUNITY
Start: 2022-10-16 | End: 2023-01-26

## 2023-01-26 RX ORDER — FLUOXETINE HYDROCHLORIDE 40 MG/1
40 CAPSULE ORAL DAILY
Qty: 90 CAPSULE | Refills: 1 | Status: SHIPPED | OUTPATIENT
Start: 2023-01-26

## 2023-02-22 DIAGNOSIS — F90.2 ATTENTION DEFICIT HYPERACTIVITY DISORDER (ADHD), COMBINED TYPE: ICD-10-CM

## 2023-07-31 DIAGNOSIS — F41.9 ANXIETY: ICD-10-CM

## 2023-07-31 DIAGNOSIS — E03.9 HYPOTHYROIDISM, UNSPECIFIED TYPE: ICD-10-CM

## 2023-07-31 NOTE — TELEPHONE ENCOUNTER
PSR: pls contact pt to schedule f/u and complete blood work. LOV: 1/26/23, told to RTC in one month.

## 2023-07-31 NOTE — TELEPHONE ENCOUNTER
Medication(s) to Refill:   Requested Prescriptions     Pending Prescriptions Disp Refills    LEVOTHYROXINE 75 MCG Oral Tab [Pharmacy Med Name: LEVOTHYROXINE 0.075MG (75MCG) TABS] 90 tablet 1     Sig: TAKE 1 TABLET(75 MCG) BY MOUTH BEFORE BREAKFAST    FLUOXETINE HCL 40 MG Oral Cap [Pharmacy Med Name: FLUOXETINE 40MG CAPSULES] 90 capsule 1     Sig: TAKE 1 CAPSULE(40 MG) BY MOUTH DAILY         Reason for Medication Refill being sent to Provider / Reason Protocol Failed:  [] 90 day refill has already been granted  [] Blood Pressure out of range  [x] Labs Abnormal/over due  [] Medication not previously prescribed by Provider  [x] Non-Protocol Medication  [] Controlled Substance   [x] Due for appointment- no future appointment scheduled  [] No Follow up specified    Last Time Medication was Filled:  1/26/23    Last Office Visit with PCP: 1/26/23    When Patient was Due Back to the Office:  1 month     Future Appointments:  No future appointments.     Last Blood Pressures:  BP Readings from Last 2 Encounters:  01/26/23 : 122/72  01/14/22 : 112/70    Action taken:  [] Refill approved per protocol  [x] Routing to provider for approval

## 2023-08-03 RX ORDER — LEVOTHYROXINE SODIUM 0.07 MG/1
75 TABLET ORAL
Qty: 90 TABLET | Refills: 0 | Status: SHIPPED | OUTPATIENT
Start: 2023-08-03

## 2023-08-03 RX ORDER — FLUOXETINE HYDROCHLORIDE 40 MG/1
40 CAPSULE ORAL DAILY
Qty: 90 CAPSULE | Refills: 0 | Status: SHIPPED | OUTPATIENT
Start: 2023-08-03

## 2023-11-02 DIAGNOSIS — E03.9 HYPOTHYROIDISM, UNSPECIFIED TYPE: ICD-10-CM

## 2023-11-03 RX ORDER — LEVOTHYROXINE SODIUM 0.07 MG/1
75 TABLET ORAL
Qty: 30 TABLET | Refills: 0 | Status: SHIPPED | OUTPATIENT
Start: 2023-11-03

## 2023-11-04 DIAGNOSIS — E03.9 HYPOTHYROIDISM, UNSPECIFIED TYPE: ICD-10-CM

## 2023-11-06 RX ORDER — LEVOTHYROXINE SODIUM 0.07 MG/1
75 TABLET ORAL
Qty: 90 TABLET | Refills: 0 | OUTPATIENT
Start: 2023-11-06

## 2023-11-30 DIAGNOSIS — F41.9 ANXIETY: ICD-10-CM

## 2023-12-03 DIAGNOSIS — E03.9 HYPOTHYROIDISM, UNSPECIFIED TYPE: ICD-10-CM

## 2023-12-04 RX ORDER — LEVOTHYROXINE SODIUM 0.07 MG/1
75 TABLET ORAL
Qty: 90 TABLET | Refills: 0 | OUTPATIENT
Start: 2023-12-04

## 2023-12-05 ENCOUNTER — LAB ENCOUNTER (OUTPATIENT)
Dept: LAB | Age: 38
End: 2023-12-05
Attending: EMERGENCY MEDICINE
Payer: COMMERCIAL

## 2023-12-05 DIAGNOSIS — Z00.00 LABORATORY EXAMINATION ORDERED AS PART OF A ROUTINE GENERAL MEDICAL EXAMINATION: ICD-10-CM

## 2023-12-05 LAB
ALBUMIN SERPL-MCNC: 3.8 G/DL (ref 3.4–5)
ALBUMIN/GLOB SERPL: 1.1 {RATIO} (ref 1–2)
ALP LIVER SERPL-CCNC: 73 U/L
ALT SERPL-CCNC: 19 U/L
ANION GAP SERPL CALC-SCNC: 5 MMOL/L (ref 0–18)
AST SERPL-CCNC: 16 U/L (ref 15–37)
BASOPHILS # BLD AUTO: 0.05 X10(3) UL (ref 0–0.2)
BASOPHILS NFR BLD AUTO: 0.9 %
BILIRUB SERPL-MCNC: 0.5 MG/DL (ref 0.1–2)
BUN BLD-MCNC: 12 MG/DL (ref 9–23)
CALCIUM BLD-MCNC: 8.7 MG/DL (ref 8.5–10.1)
CHLORIDE SERPL-SCNC: 106 MMOL/L (ref 98–112)
CHOLEST SERPL-MCNC: 204 MG/DL (ref ?–200)
CO2 SERPL-SCNC: 27 MMOL/L (ref 21–32)
CREAT BLD-MCNC: 1.06 MG/DL
EGFRCR SERPLBLD CKD-EPI 2021: 68 ML/MIN/1.73M2 (ref 60–?)
EOSINOPHIL # BLD AUTO: 0.17 X10(3) UL (ref 0–0.7)
EOSINOPHIL NFR BLD AUTO: 3 %
ERYTHROCYTE [DISTWIDTH] IN BLOOD BY AUTOMATED COUNT: 12.3 %
FASTING PATIENT LIPID ANSWER: YES
FASTING STATUS PATIENT QL REPORTED: YES
GLOBULIN PLAS-MCNC: 3.5 G/DL (ref 2.8–4.4)
GLUCOSE BLD-MCNC: 107 MG/DL (ref 70–99)
HCT VFR BLD AUTO: 38.9 %
HDLC SERPL-MCNC: 45 MG/DL (ref 40–59)
HGB BLD-MCNC: 12.8 G/DL
IMM GRANULOCYTES # BLD AUTO: 0.01 X10(3) UL (ref 0–1)
IMM GRANULOCYTES NFR BLD: 0.2 %
LDLC SERPL CALC-MCNC: 139 MG/DL (ref ?–100)
LYMPHOCYTES # BLD AUTO: 1.58 X10(3) UL (ref 1–4)
LYMPHOCYTES NFR BLD AUTO: 27.7 %
MCH RBC QN AUTO: 30.3 PG (ref 26–34)
MCHC RBC AUTO-ENTMCNC: 32.9 G/DL (ref 31–37)
MCV RBC AUTO: 92 FL
MONOCYTES # BLD AUTO: 0.39 X10(3) UL (ref 0.1–1)
MONOCYTES NFR BLD AUTO: 6.8 %
NEUTROPHILS # BLD AUTO: 3.5 X10 (3) UL (ref 1.5–7.7)
NEUTROPHILS # BLD AUTO: 3.5 X10(3) UL (ref 1.5–7.7)
NEUTROPHILS NFR BLD AUTO: 61.4 %
NONHDLC SERPL-MCNC: 159 MG/DL (ref ?–130)
OSMOLALITY SERPL CALC.SUM OF ELEC: 286 MOSM/KG (ref 275–295)
PLATELET # BLD AUTO: 263 10(3)UL (ref 150–450)
POTASSIUM SERPL-SCNC: 4.3 MMOL/L (ref 3.5–5.1)
PROT SERPL-MCNC: 7.3 G/DL (ref 6.4–8.2)
RBC # BLD AUTO: 4.23 X10(6)UL
SODIUM SERPL-SCNC: 138 MMOL/L (ref 136–145)
TRIGL SERPL-MCNC: 110 MG/DL (ref 30–149)
TSI SER-ACNC: 2.77 MIU/ML (ref 0.36–3.74)
VLDLC SERPL CALC-MCNC: 20 MG/DL (ref 0–30)
WBC # BLD AUTO: 5.7 X10(3) UL (ref 4–11)

## 2023-12-05 PROCEDURE — 36415 COLL VENOUS BLD VENIPUNCTURE: CPT

## 2023-12-05 PROCEDURE — 80053 COMPREHEN METABOLIC PANEL: CPT

## 2023-12-05 PROCEDURE — 80061 LIPID PANEL: CPT

## 2023-12-05 PROCEDURE — 84443 ASSAY THYROID STIM HORMONE: CPT

## 2023-12-05 PROCEDURE — 85025 COMPLETE CBC W/AUTO DIFF WBC: CPT

## 2023-12-07 RX ORDER — FLUOXETINE HYDROCHLORIDE 40 MG/1
40 CAPSULE ORAL DAILY
Qty: 30 CAPSULE | Refills: 0 | OUTPATIENT
Start: 2023-12-07

## 2023-12-20 ENCOUNTER — PATIENT MESSAGE (OUTPATIENT)
Dept: FAMILY MEDICINE CLINIC | Facility: CLINIC | Age: 38
End: 2023-12-20

## 2023-12-20 NOTE — TELEPHONE ENCOUNTER
Patient called back, she was prescribed a new med by doctor about a week ago and the symptoms she's having now she's questioning whether or not these might be side effects or if she's really sick. Please call.

## 2023-12-20 NOTE — TELEPHONE ENCOUNTER
Patient experiencing nausea. Symptom started last night. Daughter diagnosed today with strep throat. Patient does not have any other symptoms at this time other than nausea. Since starting Qelbree, will experience episodes of hot and cold. Patient does not know if symptoms are side effects of Qelbree or if she is getting sick. Advised patient on [facility] but patient declined stating that her brother is a neurosurgeon and directed her to the ordering provider.

## 2023-12-22 RX ORDER — AMOXICILLIN 875 MG/1
875 TABLET, COATED ORAL 2 TIMES DAILY
Qty: 20 TABLET | Refills: 0 | Status: SHIPPED | OUTPATIENT
Start: 2023-12-22 | End: 2024-01-01

## 2023-12-22 NOTE — TELEPHONE ENCOUNTER
Patient is probably getting sick with strep throat  Will treat empirically for strep throat with  amoxicillin  Feeling hot and cold is non specific.  May possible be chills from Strep throat    Pt to start Abx, follow up with OV in 4-5 days if not better  To ER if worse

## 2024-03-10 DIAGNOSIS — F41.9 ANXIETY: ICD-10-CM

## 2024-03-11 NOTE — TELEPHONE ENCOUNTER
Medication(s) to Refill:   Requested Prescriptions     Pending Prescriptions Disp Refills    FLUOXETINE HCL 40 MG Oral Cap [Pharmacy Med Name: FLUOXETINE 40MG CAPSULES] 90 capsule 0     Sig: TAKE 1 CAPSULE(40 MG) BY MOUTH DAILY         Reason for Medication Refill being sent to Provider / Reason Protocol Failed:  [] 90 day refill has already been granted  [] Blood Pressure out of range  [] Labs Abnormal/over due  [] Medication not previously prescribed by Provider  [x] Non-Protocol Medication  [] Controlled Substance   [x] Due for appointment- no future appointment scheduled  [] No Follow up specified      Last Time Medication was Filled:  12/7/2023      Last Office Visit with PCP: 12/7/2023    When Patient was Due Back to the Office:  1 month   (from when PCP last addressed condition)    Future Appointments:  No future appointments.      Last Blood Pressures:  BP Readings from Last 2 Encounters:   12/07/23 116/68   01/26/23 122/72     Action taken:  [] Refill approved per protocol  [x] Routing to provider for approval

## 2024-03-14 RX ORDER — FLUOXETINE HYDROCHLORIDE 40 MG/1
40 CAPSULE ORAL DAILY
Qty: 30 CAPSULE | Refills: 0 | Status: SHIPPED | OUTPATIENT
Start: 2024-03-14

## 2024-03-14 NOTE — TELEPHONE ENCOUNTER
Pt is due for 1 month f/u.    PSR: pls contact pt to schedule VV or in person one month f/u. 30 day supply sent.

## 2024-04-22 PROBLEM — F90.2 ATTENTION DEFICIT HYPERACTIVITY DISORDER (ADHD), COMBINED TYPE: Status: ACTIVE | Noted: 2024-04-22

## 2024-10-16 DIAGNOSIS — F41.9 ANXIETY: ICD-10-CM

## 2024-10-16 DIAGNOSIS — E06.3 HYPOTHYROIDISM DUE TO HASHIMOTO'S THYROIDITIS: ICD-10-CM

## 2024-10-16 RX ORDER — LEVOTHYROXINE SODIUM 88 UG/1
88 TABLET ORAL
Qty: 90 TABLET | Refills: 0 | Status: SHIPPED | OUTPATIENT
Start: 2024-10-16

## 2024-10-16 RX ORDER — FLUOXETINE 40 MG/1
40 CAPSULE ORAL DAILY
Qty: 90 CAPSULE | Refills: 0 | Status: SHIPPED | OUTPATIENT
Start: 2024-10-16

## 2024-11-19 ENCOUNTER — HOSPITAL ENCOUNTER (EMERGENCY)
Age: 39
Discharge: HOME | End: 2024-11-19
Attending: EMERGENCY MEDICINE
Payer: COMMERCIAL

## 2024-11-19 ENCOUNTER — APPOINTMENT (OUTPATIENT)
Dept: CT IMAGING | Age: 39
End: 2024-11-19
Attending: PHYSICIAN ASSISTANT
Payer: COMMERCIAL

## 2024-11-19 VITALS
RESPIRATION RATE: 16 BRPM | WEIGHT: 202 LBS | SYSTOLIC BLOOD PRESSURE: 122 MMHG | OXYGEN SATURATION: 99 % | HEART RATE: 62 BPM | TEMPERATURE: 98 F | DIASTOLIC BLOOD PRESSURE: 74 MMHG | BODY MASS INDEX: 32.47 KG/M2 | HEIGHT: 66 IN

## 2024-11-19 DIAGNOSIS — S09.90XA INJURY OF HEAD, INITIAL ENCOUNTER: Primary | ICD-10-CM

## 2024-11-19 PROCEDURE — S0119 ONDANSETRON 4 MG: HCPCS | Performed by: PHYSICIAN ASSISTANT

## 2024-11-19 PROCEDURE — 99284 EMERGENCY DEPT VISIT MOD MDM: CPT

## 2024-11-19 PROCEDURE — 70450 CT HEAD/BRAIN W/O DYE: CPT | Performed by: PHYSICIAN ASSISTANT

## 2024-11-19 RX ORDER — ONDANSETRON 4 MG/1
4 TABLET, ORALLY DISINTEGRATING ORAL ONCE
Status: COMPLETED | OUTPATIENT
Start: 2024-11-19 | End: 2024-11-19

## 2024-11-19 RX ORDER — ONDANSETRON 4 MG/1
4 TABLET, ORALLY DISINTEGRATING ORAL EVERY 4 HOURS PRN
Qty: 10 TABLET | Refills: 0 | Status: SHIPPED | OUTPATIENT
Start: 2024-11-19 | End: 2024-11-22

## 2024-11-19 NOTE — DISCHARGE INSTRUCTIONS
Take Tylenol, Zofran close follow-up with your primary care physician if you have any significant increase in pain discomfort return to the emergency room you do have some incidental findings on the CT that need some follow-up with small cyst.    You were seen in the emergency room in a limited time.  There is a possibility that although we do not see any acute process at this present time that things can change with time.  Is therefore imperative that you follow-up with primary care physician for close follow-up.  If there is any significant progression of your pain  or other symptoms you to return immediately to the emergency room.

## 2024-11-19 NOTE — ED PROVIDER NOTES
Patient Seen in: [facility] In [place]      History     Chief Complaint   Patient presents with    Head Neck Injury     Stated Complaint: she fell and hit her head on a door frame has a severe headache, dizzy, nausea    Subjective:   HPI      This is a 39-year-old female who arrives here she was standing on a barstool hanging [place] lights in her house the stool tipped and she fell hitting her head on the door frame.  She does not think she passed out she does have complaints of headache, nausea, dizziness.  The patient denies any neck pain denies any lower extremity numbness or weakness.  Denies any upper extremity pain did.  She was nauseous.    Objective:     Past Medical History:    Gall stones    Hypothyroidism              Past Surgical History:   Procedure Laterality Date    Cholecystectomy  2009    Leep  2007    Removal gallbladder                  Social History     Socioeconomic History    Marital status:    Tobacco Use    Smoking status: Never    Smokeless tobacco: Never   Vaping Use    Vaping status: Never Used   Substance and Sexual Activity    Alcohol use: Yes     Alcohol/week: 0.0 standard drinks of alcohol     Comment: social    Drug use: No    Sexual activity: Yes     Partners: Male   Other Topics Concern    Caffeine Concern No    Exercise Yes    Seat Belt Yes                  Physical Exam     ED Triage Vitals [11/19/24 1605]   /66   Pulse 83   Resp 16   Temp 98.4 °F (36.9 °C)   Temp src Temporal   SpO2 98 %   O2 Device None (Room air)       Current Vitals:   Vital Signs  BP: 131/66  Pulse: 83  Resp: 16  Temp: 98.4 °F (36.9 °C)  Temp src: Temporal    Oxygen Therapy  SpO2: 98 %  O2 Device: None (Room air)        Physical Exam     General: .  Patient is a pleasant female she seems nauseous.  She has a some tenderness in back of the scalp.  No midline neck tenderness.   The patient is in no respiratory distress. The patient is not septic or toxic    HEENT:  Atraumatic, conjunctiva are not pale.  There is no icterus.  Oral mucosa Is wet. The neck is supple. There is no meningismus.  She has a little photophobia but her pupils are equal reactive.    LUNGS: Clear to auscultation, there is no wheezing or retraction.  No crackles.    CV: Cardiovascular is regular without murmurs or rubs.    ABD: The abdomen is soft nondistended nontender.  There is no rebound.  There is no guarding.  Bowel sounds are present.    EXT: There is good pulses bilaterally.  There is no calf tenderness.  There is no rash noted.  There is no edema    NEURO: Alert and oriented x3.  Cranial nerves are grossly intact.  .  Extraocular muscles are intact.  Muscle strength is 5 out of 5.  Sensory exam is normal.    The patient is otherwise neurovascularly intact.  Patient has normal gait  ED Course   Labs Reviewed - No data to display     Workup was done to rule out intracranial bleed, mass           MDM      I personally reviewed the radiographs and my individual interpretation shows    No obvious intracranial bleed, mass    Also reviewed official report and it shows    CT BRAIN OR HEAD (CPT=70450)    Result Date: 11/19/2024            PROCEDURE:  CT BRAIN OR HEAD (13398)  COMPARISON:  None.  INDICATIONS:  she fell and hit her head on a door frame has a severe headache, dizzy, nausea  TECHNIQUE:  Noncontrast CT scanning is performed through the brain. Dose reduction techniques were used. Dose information is transmitted to the ACR (American College of Radiology) NRDR (National Radiology Data Registry) which includes the Dose Index Registry.  PATIENT STATED HISTORY: (As transcribed by Technologist)   she fell and hit her head on a door frame has a severe headache, dizzy, nausea .    FINDINGS: No evidence of hemorrhage or extra-axial fluid collection.  Supra and infratentorial brain parenchyma are unremarkable.  Ventricles and sulci are appropriate for the patient's age.  Small rack noted cyst adjacent to  the parasagittal left frontal lobe measuring 3.4 x 1.5 x 1.6 cm.  Visualized portions of paranasal sinuses are unremarkable.  Visualized portions of mastoid air cells are unremarkable.  Visualized portions of orbits are unremarkable.  IMPRESSION: Unremarkable CT head.    LOCATION:  [facility]   Dictated by (CST): Jose Arango MD on 11/19/2024 at 5:09 PM     Finalized by (CST): Jose Arango MD on 11/19/2024 at 5:10 PM        I did go back and reexamined her prior to her discharge.  She feels much better on repeat exam.  Her neck is supple.  Muscling since exam is normal.  Discussed with her that she does have a head injury I discussed with the limit her contact sports, no extraneous stimulus.  Discussed the things with her and her  are in the room the importance of close follow-up she was given hydration a prescription of severe headache, nausea, vomiting to return here.  I did write her prescription for Zofran recommend close follow-up with her primary I discussed there is an incidental  arachnoid cyst.  Discussed that should be follow-up with her own primary care physician.  Medical Decision Making      Disposition and Plan     Clinical Impression:  1. Injury of head, initial encounter         Disposition:  Discharge  11/19/2024  5:28 pm    Follow-up:  Gila Fry MD  [address] [id number] [phone]    Follow up in 2 day(s)            Medications Prescribed:  Current Discharge Medication List        START taking these medications    Details   ondansetron 4 MG Oral Tablet Dispersible Take 1 tablet (4 mg total) by mouth every 4 (four) hours as needed for Nausea.  Qty: 10 tablet, Refills: 0                 Supplementary Documentation:

## 2024-11-19 NOTE — ED INITIAL ASSESSMENT (HPI)
Pt states around 1515 she was standing on a barstool hanging [place] lights in her house, stool tipped and she fell hitting head to the door frame. Pt is unsure if she had loc. Pt now reports head pain, nausea and dizziness.

## 2024-11-22 ENCOUNTER — OFFICE VISIT (OUTPATIENT)
Dept: FAMILY MEDICINE CLINIC | Facility: CLINIC | Age: 39
End: 2024-11-22
Payer: COMMERCIAL

## 2024-11-22 ENCOUNTER — PATIENT MESSAGE (OUTPATIENT)
Dept: FAMILY MEDICINE CLINIC | Facility: CLINIC | Age: 39
End: 2024-11-22

## 2024-11-22 VITALS
DIASTOLIC BLOOD PRESSURE: 60 MMHG | OXYGEN SATURATION: 98 % | HEART RATE: 68 BPM | WEIGHT: 206 LBS | HEIGHT: 66 IN | SYSTOLIC BLOOD PRESSURE: 96 MMHG | BODY MASS INDEX: 33.11 KG/M2

## 2024-11-22 DIAGNOSIS — S06.0X0A CONCUSSION WITHOUT LOSS OF CONSCIOUSNESS, INITIAL ENCOUNTER: ICD-10-CM
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
## 2024-11-22 DIAGNOSIS — H57.13 PAIN OF BOTH EYES: Primary | ICD-10-CM

## 2024-11-22 PROCEDURE — 99213 OFFICE O/P EST LOW 20 MIN: CPT | Performed by: EMERGENCY MEDICINE

## 2024-11-22 NOTE — PATIENT INSTRUCTIONS
Thank you for choosing [facility]  To Do:  FOR MARTHA PEARSON    Activity as tolerated  Follow up with Ophthalmologist if Sx persist, Dr Oppenheim  Follow up for annual physical when ready
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
## 2025-01-16 DIAGNOSIS — E06.3 HYPOTHYROIDISM DUE TO HASHIMOTO'S THYROIDITIS: ICD-10-CM

## 2025-01-16 RX ORDER — LEVOTHYROXINE SODIUM 88 UG/1
88 TABLET ORAL
Qty: 90 TABLET | Refills: 0 | Status: SHIPPED | OUTPATIENT
Start: 2025-01-16

## 2025-01-16 NOTE — TELEPHONE ENCOUNTER
Medication(s) to Refill:   Requested Prescriptions     Pending Prescriptions Disp Refills    levothyroxine 88 MCG Oral Tab 90 tablet 0     Sig: Take 1 tablet (88 mcg total) by mouth before breakfast.         Reason for Medication Refill being sent to Provider / Reason Protocol Failed:  [] 90 day refill has already been granted  [] Blood Pressure out of range  [] Labs Abnormal/over due  [] Medication not previously prescribed by Provider  [] Non-Protocol Medication  [] Controlled Substance   [] Due for appointment- no future appointment scheduled  [] No Follow up specified      Last Time Medication was Filled:  10/16/24      Last Office Visit with PCP: 11/22/24    When Patient was Due Back to the Office:    (from when PCP last addressed condition)    Future Appointments:  Future Appointments   Date Time Provider Department Center   1/21/2025 10:00 AM Gila Fry MD [facility]         Last Blood Pressures:  BP Readings from Last 2 Encounters:   11/22/24 96/60   11/19/24 122/74         Action taken:  [] Refill approved per protocol  [] Routing to provider for approval

## 2025-01-21 ENCOUNTER — PATIENT MESSAGE (OUTPATIENT)
Dept: FAMILY MEDICINE CLINIC | Facility: CLINIC | Age: 40
End: 2025-01-21

## 2025-01-22 NOTE — TELEPHONE ENCOUNTER
Future Appointments   Date Time Provider Department Center   1/23/2025 12:30 PM Gila Fry MD [facility]

## 2025-01-23 ENCOUNTER — OFFICE VISIT (OUTPATIENT)
Dept: FAMILY MEDICINE CLINIC | Facility: CLINIC | Age: 40
End: 2025-01-23
Payer: COMMERCIAL

## 2025-01-23 VITALS
SYSTOLIC BLOOD PRESSURE: 98 MMHG | BODY MASS INDEX: 33.43 KG/M2 | WEIGHT: 208 LBS | HEIGHT: 66 IN | HEART RATE: 80 BPM | RESPIRATION RATE: 17 BRPM | OXYGEN SATURATION: 97 % | DIASTOLIC BLOOD PRESSURE: 60 MMHG

## 2025-01-23 DIAGNOSIS — Z12.4 SCREENING FOR CERVICAL CANCER: ICD-10-CM

## 2025-01-23 DIAGNOSIS — Z23 NEED FOR TDAP VACCINATION: ICD-10-CM

## 2025-01-23 DIAGNOSIS — F90.2 ATTENTION DEFICIT HYPERACTIVITY DISORDER (ADHD), COMBINED TYPE: ICD-10-CM

## 2025-01-23 DIAGNOSIS — E06.3 HYPOTHYROIDISM DUE TO HASHIMOTO'S THYROIDITIS: ICD-10-CM

## 2025-01-23 DIAGNOSIS — Z00.00 ENCOUNTER FOR ANNUAL PHYSICAL EXAM: Primary | ICD-10-CM

## 2025-01-23 DIAGNOSIS — F41.9 ANXIETY: ICD-10-CM

## 2025-01-23 DIAGNOSIS — Z12.31 SCREENING MAMMOGRAM FOR BREAST CANCER: ICD-10-CM

## 2025-01-23 PROCEDURE — 90715 TDAP VACCINE 7 YRS/> IM: CPT | Performed by: EMERGENCY MEDICINE

## 2025-01-23 PROCEDURE — 99396 PREV VISIT EST AGE 40-64: CPT | Performed by: EMERGENCY MEDICINE

## 2025-01-23 PROCEDURE — 90471 IMMUNIZATION ADMIN: CPT | Performed by: EMERGENCY MEDICINE

## 2025-01-23 PROCEDURE — 99214 OFFICE O/P EST MOD 30 MIN: CPT | Performed by: EMERGENCY MEDICINE

## 2025-01-23 RX ORDER — FLUOXETINE 40 MG/1
40 CAPSULE ORAL DAILY
Qty: 90 CAPSULE | Refills: 1 | Status: SHIPPED | OUTPATIENT
Start: 2025-01-23
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
## 2025-01-23 NOTE — PROGRESS NOTES
Denice Currie is a 39 year old female who presents for a complete physical exam.   HPI:     Chief Complaint   Patient presents with    Well Adult                   Age: 39    1First day of last menstrual period (or first year of         menstruation, if through menopause NOW   2Number of times pregnant: 3              Number of completed pregnancies:  2 , 1 misscarriage              Date of last pregnancy:  2013   3. If you are under age 55, what method of birth control do you use? NO,               Are you planning a pregnancy  in the next 6-12 months? NO               If you are through menopause or over age 50, do you take  any of the following pills?                          Calcium NO                        Estrogen (Premarin)              NO                        Progesterone (Provera) NO   4. Have you had any of the following problems:               A.  Abnormal Pap smears  1 abn in the pasts 2006  Subsequent ones Nl, last pap 2020 nl               If yes, date:                 problem:                 For abnormality, did you have any of the following done:                    Colposcopy  YES                  Biopsies     YES                  Surgery     YES Leep Procedure                         B. High blood pressure, heart disease or high cholesterol NO                  D.Abdominal or pelvic surgery or special tests  NO   5. Do you have any of the following:      Problems with present method of birth control NA   Bleeding between periods or since periods stopped   NO    A new or enlarging lump in breast  NO      Change in size/firmness of stools  NO   Change in size/color of a mole  NO   6. Do you have a parent, brother or sister with a history of the following:                  Cancer of the breast, intestine or female organs NO                 Heart pain or heart attacks  before the age of 55 NO   8. Have you ever used tobacco?              Minimal in college   9. Do you drink alcohol?               2 drinks the past 6 month    In the past 4-5 glasses a week more in the weekends     10. Prevention:         b. Exercise:                              Activity: NO       c. Do you always wear seat belts?          YES        d. If over 30 years old, have you  had your cholesterol level checked  in the past five years? YES NO       e. Have you had a tetanus shot  the past 10 years? NO       f. Does your house have a working smoke detector? YES        h. Have you ever had a mammogram?  NO       i.  How many sexual partners have you  had in the last 12 months? 1            In your lifetime?       j. When is the last time you had           a dental check-up?  Within 6 months         11. Please describe any concerns you have:                                    MOOD DISORDER  On fluoxetine  Mood overall better  Feels well  Was able to lose weight with NOOM. Lost 30 lbs in the past  No depressive Sx      HYPOTHYROIDISM  On Levothyroxine  Compliant with meds          ADHD  Patient with a known history of ADHD.  In the past she was treated with Vyvanse which she states made her mean and anxiety worsened.  States that she showed up at the dentist office on the wrong day.  Younger daughter gets mad at her frequently.  Has lost many things.  Cannot remember tasks.  Difficult to accomplish tasks  Tried Quelbree in the past which she discontinued for unclear reasons.  Currently on no medication           Wt Readings from Last 3 Encounters:   01/23/25 208 lb (94.3 kg)   11/22/24 206 lb (93.4 kg)   11/19/24 202 lb (91.6 kg)        BP Readings from Last 3 Encounters:   01/23/25 98/60   11/22/24 96/60   11/19/24 122/74         Patient's last menstrual period was 01/19/2025 (approximate).       Current Outpatient Medications   Medication Sig Dispense Refill    FLUoxetine HCl 40 MG Oral Cap Take 1 capsule (40 mg total) by mouth daily. 90 capsule 1    levothyroxine 88 MCG Oral Tab Take 1 tablet (88 mcg total) by mouth before breakfast.  90 tablet 0      Past Medical History:    Gall stones    Hypothyroidism      Past Surgical History:   Procedure Laterality Date    Cholecystectomy  2009    Leep  2007    Removal gallbladder        Family History   Problem Relation Age of Onset    Other (Other) Other         osteoporosis mat side    Heart Disorder Father     Other (Other) Father         Diverticulosis    Cancer Mother         blastoma    Other (Osteoporosis) Maternal Aunt     Other (Osteoporosis) Maternal Grandmother     Diabetes Paternal Grandfather       Social History     Socioeconomic History    Marital status:    Tobacco Use    Smoking status: Never     Passive exposure: Never    Smokeless tobacco: Never   Vaping Use    Vaping status: Never Used   Substance and Sexual Activity    Alcohol use: Yes     Alcohol/week: 0.0 standard drinks of alcohol     Comment: social    Drug use: No    Sexual activity: Yes     Partners: Male   Other Topics Concern    Caffeine Concern No    Exercise Yes    Seat Belt Yes        Current Outpatient Medications   Medication Sig Dispense Refill    FLUoxetine HCl 40 MG Oral Cap Take 1 capsule (40 mg total) by mouth daily. 90 capsule 1    levothyroxine 88 MCG Oral Tab Take 1 tablet (88 mcg total) by mouth before breakfast. 90 tablet 0      Past Medical History:    Gall stones    Hypothyroidism      Past Surgical History:   Procedure Laterality Date    Cholecystectomy  2009    Leep  2007    Removal gallbladder        Family History   Problem Relation Age of Onset    Other (Other) Other         osteoporosis mat side    Heart Disorder Father     Other (Other) Father         Diverticulosis    Cancer Mother         blastoma    Other (Osteoporosis) Maternal Aunt     Other (Osteoporosis) Maternal Grandmother     Diabetes Paternal Grandfather       Social History:   Social History     Socioeconomic History    Marital status:    Tobacco Use    Smoking status: Never     Passive exposure: Never    Smokeless tobacco:  Never   Vaping Use    Vaping status: Never Used   Substance and Sexual Activity    Alcohol use: Yes     Alcohol/week: 0.0 standard drinks of alcohol     Comment: social    Drug use: No    Sexual activity: Yes     Partners: Male   Other Topics Concern    Caffeine Concern No    Exercise Yes    Seat Belt Yes            REVIEW OF SYSTEMS:   GENERAL HEALTH: feels well, no fatigue.  SKIN: denies any unusual skin lesions or rashes  EYES: no visual complaints or deficits  HEENT: denies nasal congestion, sinus pain or sore throat; hearing loss negative,   RESPIRATORY: denies shortness of breath, wheezing or cough   CARDIOVASCULAR: denies chest pain, SOB, edema,orthopnea, no palpitations   GI: denies nausea, vomiting, constipation, diarrhea; no rectal bleeding; no heartburn  GENITAL/: no dysuria, urgency or frequency  MUSCULOSKELETAL: no joint complaints upper or lower extremities  NEURO: no sensory or motor complaint  HEMATOLOGY: denies hx anemia; denies bruising or excessive bleeding  ENDOCRINE: denies excessive thirst or urination; denies unexpected wt gain or wt loss  ALLERGY/IMM.: denies food or seasonal allergies  PSYCH: no symptoms of depression or anxiety, depression screening negative.      EXAM:   BP 98/60   Pulse 80   Resp 17   Ht 5' 6\" (1.676 m)   Wt 208 lb (94.3 kg)   LMP 01/19/2025 (Approximate)   SpO2 97%   BMI 33.57 kg/m²      General: WD/WN in no acute distress.   HEENT: PERRLA and EOMI.  OP moist no lesions.TM WNL, delmy.Normal ears canals bilaterally.  Neck is supple, with no cervical LAD or thyroid abnormalities. No carotid bruits.    Lungs: are clear to auscultation bilaterally, with no wheeze, rhonchi, or rales.   Heart: is RRR.  S1, S2, with no murmurs,clicks, gallops  Abdomen: is soft,NBS, NT/ND with no HSM.  No rebound or guarding. No CVA tenderness, no hernias.   exam: deferred  Neuro: Cranial nerves II-XII normal,no focal abnormalities, and reflexes coordination and gait normal and  symmetric.Sensation intact.  Extremities: are symmetric with no cyanosis, clubbing, or edema.  MS: Normal muscles tones, no joints abnormalities.  SKIN: Normal color, turgor, no lesions, rashes or wounds.  PSYCH: normal affect and mood.      ASSESSMENT AND PLAN:       1. Encounter for annual physical exam  - CBC With Differential With Platelet; Future  - Comp Metabolic Panel (14); Future  - Lipid Panel; Future    2. Attention deficit hyperactivity disorder (ADHD), combined type  Stable, still with mod sx. But patient declines on any treatment.  Offered Wellbutrin but patient declines  Declines on vyvanse.   Does not feel that Tx is necessary    3. Anxiety  - FLUoxetine HCl 40 MG Oral Cap; Take 1 capsule (40 mg total) by mouth daily.  Dispense: 90 capsule; Refill: 1  Stable ok to contoinue    4. Hypothyroidism due to Hashimoto's thyroiditis  Await labs    5. Screening for cervical cancer  Declines    6. Screening mammogram for breast cancer  - [facility] TEOFILO 2D+3D SCREENING BILAT (CPT=77067/41575); Future    7. Need for Tdap vaccination  - TETANUS, DIPHTHERIA TOXOIDS AND ACELLULAR PERTUSIS VACCINE (TDAP), >7 YEARS, IM USE          Denice Currie is a 39 year old female who presents for a complete physical exam.Gyn exam and Pap smear due but patient declines and states that she will return for PAP  Self breast exams advised.  The patient should schedule annual mammograms beginning at the age of 40.    Counseled on fat diet and aerobic exercise 30 minutes three times weekly.   Counseled on maintaining a healthy weight and healthy BMI  Health maintenance.   Immunizations reviewed and updated  TDAP given today  The patient indicates understanding of these issues and agrees to the plan.  The patient is asked  to return yearly for annual preventative health exam.    Well balanced diet recommended.    Routine exercise recommended most days during the week.  Wear sunscreen - SPF 15 or higher and reapply every 2 hours as  needed.  Wear seat belts and drive safely.  Schedule regular appointments with dentist.  Schedule yearly eye exam if you wear glasses/contacts.  Yearly Flu Vaccine recommended.  Tetanus, Diptheria and Pertussis vaccine should be given every 7-10 years.  Call or come in if there are concerns regarding domestic abuse, sexually transmitted diseases, alcohol/drug addiction, depression/anxiety issues, or any further concerns.    PATIENT INSTRUCTIONS:    Arrange for mammogram  Have blood tests done  Follow up for PAP when ready  Ok to continue with fluoxetine, consider wellbutrin  Follow up in 6 months      FOLLOW UP:  6 months

## 2025-04-18 DIAGNOSIS — E06.3 HYPOTHYROIDISM DUE TO HASHIMOTO'S THYROIDITIS: ICD-10-CM

## 2025-04-22 NOTE — TELEPHONE ENCOUNTER
Please review.  Protocol failed / Has no protocol.    No Active/ Future labs for patient to complete for TSH  Previous lab ordered 2024 has      Requested Prescriptions   Pending Prescriptions Disp Refills    levothyroxine 88 MCG Oral Tab [Pharmacy Med Name: LEVOTHYROXINE 0.088MG (88MCG) TAB] 90 tablet 0     Sig: Take 1 tablet (88 mcg total) by mouth before breakfast.       Thyroid Medication Protocol Failed - 2025 12:35 PM        Failed - TSH in past 12 months        Passed - Last TSH value is normal        Passed - In person appointment or virtual visit in the past 12 mos or appointment in next 3 mos        Passed - Medication is active on med list

## 2025-04-23 DIAGNOSIS — E06.3 HYPOTHYROIDISM DUE TO HASHIMOTO'S THYROIDITIS: ICD-10-CM

## 2025-04-23 RX ORDER — LEVOTHYROXINE SODIUM 88 UG/1
88 TABLET ORAL
Qty: 30 TABLET | Refills: 0 | Status: SHIPPED | OUTPATIENT
Start: 2025-04-23

## 2025-04-23 NOTE — TELEPHONE ENCOUNTER
Stacey from [facility] pharmacy called asking update on levothyroxine refill. Informed stacey it was currently pending, verbalizes understanding.

## 2025-04-24 DIAGNOSIS — E06.3 HYPOTHYROIDISM DUE TO HASHIMOTO'S THYROIDITIS: ICD-10-CM

## 2025-04-24 NOTE — TELEPHONE ENCOUNTER
Levothyroxine refilled x 30 d  Pt due for TSH, Pls inform patient  Needs TSH done before next OV  Last TSH on record is 2023   Sublingual abscess Ludwigs angina

## 2025-04-28 RX ORDER — LEVOTHYROXINE SODIUM 88 UG/1
88 TABLET ORAL
Qty: 90 TABLET | Refills: 0 | OUTPATIENT
Start: 2025-04-28

## 2025-04-29 RX ORDER — LEVOTHYROXINE SODIUM 88 UG/1
TABLET ORAL
Qty: 90 TABLET | Refills: 0 | OUTPATIENT
Start: 2025-04-29

## 2025-04-29 NOTE — TELEPHONE ENCOUNTER
30-day supply sent. Reminder message sent to Dr. Gila Joy to add TSH orders for patient to complete

## 2025-05-20 DIAGNOSIS — E06.3 HYPOTHYROIDISM DUE TO HASHIMOTO'S THYROIDITIS: ICD-10-CM

## 2025-05-21 NOTE — TELEPHONE ENCOUNTER
Please review; protocol failed/ has no protocol    Message sent for patient to have labs done.

## 2025-05-23 RX ORDER — LEVOTHYROXINE SODIUM 88 UG/1
TABLET ORAL
Qty: 30 TABLET | Refills: 0 | OUTPATIENT
Start: 2025-05-23

## 2025-05-27 ENCOUNTER — LAB ENCOUNTER (OUTPATIENT)
Dept: LAB | Age: 40
End: 2025-05-27
Attending: EMERGENCY MEDICINE
Payer: COMMERCIAL

## 2025-05-27 DIAGNOSIS — E06.3 HYPOTHYROIDISM DUE TO HASHIMOTO'S THYROIDITIS: ICD-10-CM

## 2025-05-27 DIAGNOSIS — Z00.00 ENCOUNTER FOR ANNUAL PHYSICAL EXAM: ICD-10-CM

## 2025-05-27 LAB
ALBUMIN SERPL-MCNC: 4.6 G/DL (ref 3.2–4.8)
ALBUMIN/GLOB SERPL: 1.8 {RATIO} (ref 1–2)
ALP LIVER SERPL-CCNC: 67 U/L (ref 37–98)
ALT SERPL-CCNC: 8 U/L (ref 10–49)
ANION GAP SERPL CALC-SCNC: 5 MMOL/L (ref 0–18)
AST SERPL-CCNC: 17 U/L (ref ?–34)
BASOPHILS # BLD AUTO: 0.05 X10(3) UL (ref 0–0.2)
BASOPHILS NFR BLD AUTO: 0.8 %
BILIRUB SERPL-MCNC: 0.5 MG/DL (ref 0.3–1.2)
BUN BLD-MCNC: 13 MG/DL (ref 9–23)
CALCIUM BLD-MCNC: 9.4 MG/DL (ref 8.7–10.6)
CHLORIDE SERPL-SCNC: 105 MMOL/L (ref 98–112)
CHOLEST SERPL-MCNC: 211 MG/DL (ref ?–200)
CO2 SERPL-SCNC: 28 MMOL/L (ref 21–32)
CREAT BLD-MCNC: 0.92 MG/DL (ref 0.55–1.02)
EGFRCR SERPLBLD CKD-EPI 2021: 80 ML/MIN/1.73M2 (ref 60–?)
EOSINOPHIL # BLD AUTO: 0.13 X10(3) UL (ref 0–0.7)
EOSINOPHIL NFR BLD AUTO: 2 %
ERYTHROCYTE [DISTWIDTH] IN BLOOD BY AUTOMATED COUNT: 13.1 %
FASTING PATIENT LIPID ANSWER: YES
FASTING STATUS PATIENT QL REPORTED: YES
GLOBULIN PLAS-MCNC: 2.6 G/DL (ref 2–3.5)
GLUCOSE BLD-MCNC: 101 MG/DL (ref 70–99)
HCT VFR BLD AUTO: 41.9 % (ref 35–48)
HDLC SERPL-MCNC: 49 MG/DL (ref 40–59)
HGB BLD-MCNC: 13.6 G/DL (ref 12–16)
IMM GRANULOCYTES # BLD AUTO: 0.01 X10(3) UL (ref 0–1)
IMM GRANULOCYTES NFR BLD: 0.2 %
LDLC SERPL CALC-MCNC: 138 MG/DL (ref ?–100)
LYMPHOCYTES # BLD AUTO: 1.65 X10(3) UL (ref 1–4)
LYMPHOCYTES NFR BLD AUTO: 25.6 %
MCH RBC QN AUTO: 30.3 PG (ref 26–34)
MCHC RBC AUTO-ENTMCNC: 32.5 G/DL (ref 31–37)
MCV RBC AUTO: 93.3 FL (ref 80–100)
MONOCYTES # BLD AUTO: 0.49 X10(3) UL (ref 0.1–1)
MONOCYTES NFR BLD AUTO: 7.6 %
NEUTROPHILS # BLD AUTO: 4.11 X10 (3) UL (ref 1.5–7.7)
NEUTROPHILS # BLD AUTO: 4.11 X10(3) UL (ref 1.5–7.7)
NEUTROPHILS NFR BLD AUTO: 63.8 %
NONHDLC SERPL-MCNC: 162 MG/DL (ref ?–130)
OSMOLALITY SERPL CALC.SUM OF ELEC: 286 MOSM/KG (ref 275–295)
PLATELET # BLD AUTO: 254 10(3)UL (ref 150–450)
POTASSIUM SERPL-SCNC: 4.3 MMOL/L (ref 3.5–5.1)
PROT SERPL-MCNC: 7.2 G/DL (ref 5.7–8.2)
RBC # BLD AUTO: 4.49 X10(6)UL (ref 3.8–5.3)
SODIUM SERPL-SCNC: 138 MMOL/L (ref 136–145)
TRIGL SERPL-MCNC: 136 MG/DL (ref 30–149)
TSI SER-ACNC: 1.77 UIU/ML (ref 0.55–4.78)
VLDLC SERPL CALC-MCNC: 25 MG/DL (ref 0–30)
WBC # BLD AUTO: 6.4 X10(3) UL (ref 4–11)

## 2025-05-27 PROCEDURE — 36415 COLL VENOUS BLD VENIPUNCTURE: CPT

## 2025-05-27 PROCEDURE — 80061 LIPID PANEL: CPT

## 2025-05-27 PROCEDURE — 85025 COMPLETE CBC W/AUTO DIFF WBC: CPT

## 2025-05-27 PROCEDURE — 80053 COMPREHEN METABOLIC PANEL: CPT

## 2025-05-27 PROCEDURE — 84443 ASSAY THYROID STIM HORMONE: CPT

## 2025-05-27 NOTE — TELEPHONE ENCOUNTER
Please review; protocol failed/ has no protocol      Please see patients MyChart Message      Denice Currie to P [facility] (supporting You) (Selected Message)        5/27/25  6:52 AM  I do apologize about not getting my labs drawn. I am going to do that tomorrow. I took my last thyroid pill today. Is it okay that I’m out? Thank you so much, have a great day!

## 2025-05-28 DIAGNOSIS — E06.3 HYPOTHYROIDISM DUE TO HASHIMOTO'S THYROIDITIS: ICD-10-CM

## 2025-05-28 RX ORDER — LEVOTHYROXINE SODIUM 88 UG/1
88 TABLET ORAL
Qty: 30 TABLET | Refills: 0 | OUTPATIENT
Start: 2025-05-28

## 2025-05-28 RX ORDER — LEVOTHYROXINE SODIUM 88 UG/1
88 TABLET ORAL
Qty: 30 TABLET | Refills: 0 | Status: SHIPPED | OUTPATIENT
Start: 2025-05-28 | End: 2025-05-29

## 2025-05-28 NOTE — TELEPHONE ENCOUNTER
Short supply was sent to [facility] on 05/28/2025.      Outpatient Medication Detail     Disp Refills Start End    levothyroxine 88 MCG Oral Tab 30 tablet 0 5/28/2025 --    Sig - Route: Take 1 tablet (88 mcg total) by mouth before breakfast. - Oral    Sent to pharmacy as: Levothyroxine Sodium 88 MCG Oral Tablet (Synthroid)    Notes to Pharmacy: Pt due for labs 30 d supply only    E-Prescribing Status: Receipt confirmed by pharmacy (5/28/2025 10:45 AM CDT)      Associated Diagnoses    Hypothyroidism due to Hashimoto's thyroiditis        Pharmacy    [facility] #[id number] [place], [place] - [address] AT [facility] [address]  & , [phone], [phone]

## 2025-05-28 NOTE — TELEPHONE ENCOUNTER
RN spoke to pt for Result Note, pt is out of Levothyroxine.  Refill sent to verified pharmacy.    5/27 Result Note:    Result Notes   Nuris Dennis RN  5/28/2025 10:45 AM CDT Back to Top      Lab order for TSH entered for in 6 months.  RN to pt call, relayed Result Note communication, pt verbalized understanding.  Refill of Levothyroxine sent to verified pharmacy per pt request.    Gila Fry MD  5/27/2025  8:39 PM CDT       All labs stable  Continue current dose of levothyroxine  TSH stable, repeat in 6 months

## 2025-05-29 RX ORDER — LEVOTHYROXINE SODIUM 88 UG/1
88 TABLET ORAL
Qty: 90 TABLET | Refills: 3 | Status: SHIPPED | OUTPATIENT
Start: 2025-05-29

## 2025-05-29 NOTE — TELEPHONE ENCOUNTER
Outpatient Medication Detail     Disp Refills Start End    levothyroxine 88 MCG Oral Tab 30 tablet 0 5/28/2025 --    Sig - Route: Take 1 tablet (88 mcg total) by mouth before breakfast. - Oral    Sent to pharmacy as: Levothyroxine Sodium 88 MCG Oral Tablet (Synthroid)    Notes to Pharmacy: Pt due for labs 30 d supply only    E-Prescribing Status: Receipt confirmed by pharmacy (5/28/2025 10:45 AM CDT)      Associated Diagnoses    Hypothyroidism due to Hashimoto's thyroiditis        Pharmacy    [facility] #[id number] [place], [place] - [address] AT [facility] [address]  & , [phone], [phone]

## (undated) DIAGNOSIS — F39 MOOD DISORDER (HCC): ICD-10-CM

## (undated) NOTE — LETTER
01/02/19        [facility]          Dear Isidoro Brooks,    We have made multiple attempts to reach you via telephone regarding your test results.      Dr. Trenton Shah has reviewed your most recent labs and states your

## (undated) NOTE — LETTER
04/24/25        Denice Currie  [id number] [facility] [address]      Dear Denice,    Our records indicate that you have outstanding lab work and or testing that was ordered for you and has not yet been completed:  ---TSH level      If you have any questions please call the office at [phone]    Thank you,     Gila Fry MD

## (undated) NOTE — MR AVS SNAPSHOT
[address]  [address] [facility] [place] [address]  [phone]               Thank you for choosing us for your health care visit with Janette Alva MD.  We are glad to serve you and happy to provide you with this summary of surgery to remove the thyroid gland, this can also cause hypothyroidism.   Symptoms of hypothyroidism can include:  · Fatigue  · Trouble concentrating or thinking clearly; forgetfulness  · Dry skin  · Hair loss  · Weight gain  · Low tolerance to cold  · Con Call your healthcare provider right away if any of these occur:  · New symptoms develop  · Symptoms return, continue, or worsen even after treatment  · Extreme fatigue  · Puffy hands, face, or feet  · Fast or irregular heartbeat  · Confusion  Call 911  Atif (nodule) in the thyroid gland. This can cause hyperthyroidism if the cells in the nodule make too much thyroid hormone and stop hormone production in the rest of the thyroid gland.   Common symptoms include:  · Shaking, nervousness, irritability  · Feeling triggers your thyroid to make T3 and T4. When your pituitary gland makes too much or too little TSH, this can cause your thyroid to be overactive (hyperthyroidism) or underactive (hypothyroidism).    Why do I need this test?  You may need this test if you h hypothyroidism. The results of other thyroid tests can help to find the cause.   How is this test done? The test requires a blood sample, which is drawn through a needle from a vein in your arm. Does this test pose any risks?   Taking a blood sample with hormone pills. Thyroid hormone pills replace the hormone your thyroid doesn’t make. You will likely need to take a daily pill for the rest of your life. Your healthcare provider will adjust your dose to achieve the right hormone levels.  Take the thyroid h much thyroid hormone. In some case, symptoms from too much thyroid hormone can be controlled by beta-blockers alone. Treating nodules  If you have benign nodules, you may not need treatment right away.  Instead, your healthcare provider may advise regular substitute for professional medical care. Always follow your healthcare professional's instructions.              Allergies as of Apr 28, 2017     Nsaids Tightness in Throat    Adhesive Tape (Rosins)                 Today's Vital Signs     BP Pulse Temp Hei

## (undated) NOTE — LETTER
06/06/19          [facility], [address]                      It looks like the doctor did approve your medication refill however they did state you are due for a follow up visit.  You can schedule this appointment on [address] or you can call the office at [phone] Monday - Friday from 8:30am - 4:30pm.         Thank You  [id number]

## (undated) NOTE — LETTER
1/16/2023  [place] Menezes [id number]    We take each of our patient's health very seriously and the key to maintaining your health is an annual wellness physical.  Review of your medical records shows that it is time for your annual wellness exam.  Please contact my office at your earliest convenience to schedule this appointment at [phone]  Thank Maikol Correa MD

## (undated) NOTE — LETTER
08/01/19        [address] [facility] [address] [facility] [address]      Dear Jacob Christine,    [facility] records indicate that you have outstanding lab work and or testing that was ordered for you and has not yet been completed:  Orders Placed This Encounter

## (undated) NOTE — MR AVS SNAPSHOT
[address]  [address]  [address] [id number] [phone]               Thank you for choosing us for your health care visit with Corinna Abbott MD.  We are glad to serve you and happy to provide you with this summary of suddenly and recently. Or the pain may be chronic. This means it has occurred for 6 months or longer. There are many possible causes of pelvic pain. The pain may be due to a problem in the female reproductive system (pictured here).  Or, it may be due to a professional's instructions.              Allergies as of Apr 04, 2017     Nsaids Tightness in Throat    Adhesive Tape (Rosins)                 Today's Vital Signs     BP Pulse Temp Weight          104/68 mmHg 70 97.6 °F (36.4 °C) (Oral) 166 lb           Cu

## (undated) NOTE — LETTER
12/01/18        [address] [facility] [address] [facility] [address]      Dear Melecio Devlin,    [address] [facility] records indicate that you have outstanding lab work and or testing that was ordered for you and has not yet been completed:  Orders Placed This Encounter

## (undated) NOTE — ED AVS SNAPSHOT
Meghann Reedjack   MRN: [id number]    Department:  [facility] in [place]   Date of Visit:  8/22/2017           Disclosure     Insurance plans vary and the physician(s) referred by the ER may not be covered by your plan.  Please cont If you have been prescribed any medication(s), please fill your prescription right away and begin taking the medication(s) as directed    If the emergency physician has read X-rays, these will be re-interpreted by a radiologist.  If there is a significant

## (undated) NOTE — LETTER
04/30/19              Carol Stiles[address], [facility]                  It looks like the doctor did approve your medication refill however they did state you are due for a follow up visit.  You can schedule this appointment on [employer] or you can call the office at [phone] Monday - Friday from 8:30am - 4:30pm.             Tianna Razo,  [id number]

## (undated) NOTE — MR AVS SNAPSHOT
[address]  [address]  [address] [address]  [phone]               Thank you for choosing us for your health care visit with Vijay Domínguez MD.  We are glad to serve you and happy to provide you with this summary of · Depression  · Personality changes  · Tingling or prickling of the hands or feet  · Heavy, absent, or irregular periods (women only)  Older adults may sometimes have other symptoms.  These can include:  · Muscle aches and weakness  · Confusion  · Incontine Call 911 right away if any of these occur:  · Fainting  · Chest pain  · Shortness of breath or trouble breathing  Date Last Reviewed: 8/24/2015  © 5628-3777 The [facility], [address] [place] [address]. All rights reserved.  This inf made by your thyroid gland. Reactions from this medicine are rare. But in some cases, it can cause a dangerous drop in white blood cells, and liver damage. Talk with your healthcare provider for more information.  If you have a fever or sore throat while ta or all of your thyroid gland. Surgery to remove cancerous nodules may be followed by radioiodine iodine ablation. After surgery, you may need to take daily thyroid hormone pills.   If you have radioactive iodine ablation  Even though it uses tiny amounts of your Zip Code and Date of Birth to complete the sign-up process. If you do not sign up before the expiration date, you must request a new code.     Your unique [facility] Access Code: [email or web address]  Expires: 6/27/2017  9:49 AM    If you have questions, you can c

## (undated) NOTE — LETTER
07/12/17 July 12, 2017        [address] [place] [address]      Dear Zaynab James: Our records indicate that you have outstanding Lab work and or testing that was ordered for you and has not yet been completed.    To provide you w

## (undated) NOTE — MR AVS SNAPSHOT
[address]  [address]  [place] [address] [phone]               Thank you for choosing us for your health care visit with Bianka Mann MD.  We are glad to serve you and happy to provide you with this summary of a detention or some nursing homes.   · Need to start a medicine or medicines that suppress your immune system  · Recently emigrated from areas where TB is more common, such as some [place]  or [place] countries  If you are a healthcare worker, yo tuberculin is injected into your skin. A Kake is drawn around the injection site with long-lasting ink. You need to return to the testing site after 48 to 72 hours to be examined by a trained healthcare worker. Does this test pose any risks?   When the n information, go to [email or web address] and click on the Sign Up Now link in the [employer] box. Enter your [employer] Activation Code exactly as it appears below along with your Zip Code and Date of Birth to complete the sign-up process.  If you do You don’t need to join a gym. Home exercises work great.  Put more priority on exercise in your life                    Visit [facility] online at  [email or web address]